# Patient Record
Sex: FEMALE | Race: WHITE | URBAN - METROPOLITAN AREA
[De-identification: names, ages, dates, MRNs, and addresses within clinical notes are randomized per-mention and may not be internally consistent; named-entity substitution may affect disease eponyms.]

---

## 2017-05-22 ENCOUNTER — OUTPATIENT (OUTPATIENT)
Dept: OUTPATIENT SERVICES | Facility: HOSPITAL | Age: 37
LOS: 1 days | Discharge: HOME | End: 2017-05-22

## 2017-06-28 DIAGNOSIS — J18.9 PNEUMONIA, UNSPECIFIED ORGANISM: ICD-10-CM

## 2017-12-27 ENCOUNTER — OUTPATIENT (OUTPATIENT)
Dept: OUTPATIENT SERVICES | Facility: HOSPITAL | Age: 37
LOS: 1 days | Discharge: HOME | End: 2017-12-27

## 2017-12-27 DIAGNOSIS — M54.5 LOW BACK PAIN: ICD-10-CM

## 2018-01-11 ENCOUNTER — EMERGENCY (EMERGENCY)
Facility: HOSPITAL | Age: 38
LOS: 0 days | Discharge: HOME | End: 2018-01-11

## 2018-01-11 DIAGNOSIS — M54.2 CERVICALGIA: ICD-10-CM
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
## 2018-01-11 DIAGNOSIS — S16.1XXA STRAIN OF MUSCLE, FASCIA AND TENDON AT NECK LEVEL, INITIAL ENCOUNTER: ICD-10-CM
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
## 2018-01-14 ENCOUNTER — OUTPATIENT (OUTPATIENT)
Dept: OUTPATIENT SERVICES | Facility: HOSPITAL | Age: 38
LOS: 1 days | Discharge: HOME | End: 2018-01-14

## 2018-01-14 DIAGNOSIS — M25.569 PAIN IN UNSPECIFIED KNEE: ICD-10-CM

## 2018-12-26 ENCOUNTER — OUTPATIENT (OUTPATIENT)
Dept: OUTPATIENT SERVICES | Facility: HOSPITAL | Age: 38
LOS: 1 days | Discharge: HOME | End: 2018-12-26

## 2018-12-26 DIAGNOSIS — R05 COUGH: ICD-10-CM

## 2019-05-26 ENCOUNTER — EMERGENCY (EMERGENCY)
Facility: HOSPITAL | Age: 39
LOS: 0 days | Discharge: HOME | End: 2019-05-26
Admitting: STUDENT IN AN ORGANIZED HEALTH CARE EDUCATION/TRAINING PROGRAM
Payer: COMMERCIAL

## 2019-05-26 VITALS
DIASTOLIC BLOOD PRESSURE: 68 MMHG | OXYGEN SATURATION: 100 % | HEART RATE: 82 BPM | TEMPERATURE: 98 F | SYSTOLIC BLOOD PRESSURE: 107 MMHG | RESPIRATION RATE: 18 BRPM

## 2019-05-26 DIAGNOSIS — Z88.1 ALLERGY STATUS TO OTHER ANTIBIOTIC AGENTS STATUS: ICD-10-CM

## 2019-05-26 DIAGNOSIS — Z88.0 ALLERGY STATUS TO PENICILLIN: ICD-10-CM

## 2019-05-26 DIAGNOSIS — K08.89 OTHER SPECIFIED DISORDERS OF TEETH AND SUPPORTING STRUCTURES: ICD-10-CM

## 2019-05-26 DIAGNOSIS — Z79.899 OTHER LONG TERM (CURRENT) DRUG THERAPY: ICD-10-CM

## 2019-05-26 PROCEDURE — 99283 EMERGENCY DEPT VISIT LOW MDM: CPT

## 2019-05-26 RX ORDER — KETOROLAC TROMETHAMINE 30 MG/ML
30 SYRINGE (ML) INJECTION ONCE
Refills: 0 | Status: DISCONTINUED | OUTPATIENT
Start: 2019-05-26 | End: 2019-05-26

## 2019-05-26 RX ADMIN — Medication 30 MILLIGRAM(S): at 08:46

## 2019-05-26 NOTE — ED PROVIDER NOTE - NS ED ROS FT
CONST: No fever, chills or bodyaches  ENT: see HPI  CARD: No chest pain, palpitations  RESP: No SOB, cough  MS: No joint pain, back pain or extremity pain/injury  SKIN: No rashes  NEURO: No headache, dizziness, paresthesias or LOC

## 2019-05-26 NOTE — ED PROVIDER NOTE - CLINICAL SUMMARY MEDICAL DECISION MAKING FREE TEXT BOX
38yo female with dental pain overlying temporary crown, No trismus, jaw swelling. PCN allergic. Will prescribe Clinda/Anaprox and pt has dental f/u.

## 2019-05-26 NOTE — ED PROVIDER NOTE - NSFOLLOWUPCLINICS_GEN_ALL_ED_FT
[facility]  Dental  [address] [place] [id number]  Phone: [phone]  Fax:   Follow Up Time:

## 2019-05-26 NOTE — ED PROVIDER NOTE - OBJECTIVE STATEMENT
38yo female with no significant PMHx presents c/o L lower dental pain worsening since last night, localized, non-radiating, constant. Patient notes she has a temporary crown and bridge in place and had a dental appt 4 days prior for permanent but had to cancel. She reports pain started then but has now worsened. She took motrin/tylenol throughout day yesterday and oxycodone from a leftover prescription at 0300. She denies jaw swelling, fever, chills, trismus.

## 2019-05-26 NOTE — ED PROVIDER NOTE - NSFOLLOWUPINSTRUCTIONS_ED_ALL_ED_FT
Dental Pain  ImageDental pain may be caused by many things, including:  Tooth decay (cavities or caries). Cavities expose the nerve of your tooth to air and to hot or cold temperatures. This can cause pain or discomfort.  Abscess or infection. A dental abscess is a collection of pus from a bacterial infection in the inner part of the tooth (pulp). It usually occurs at the end of the root of a tooth.  Injury.  An unknown reason (idiopathic).  Your pain may be mild or severe. It may occur when you are:  Chewing.  Exposed to hot or cold temperatures.  Eating or drinking sugary foods or beverages, such as soda or candy.  Your pain may be constant, or it may come and go without cause.    Follow these instructions at home:  Image   Watch your dental pain for any changes. The following actions may help to lessen any discomfort that you are feeling:    Medicines     Take over-the-counter and prescription medicines only as told by your health care provider.  If you were prescribed an antibiotic medicine, take it as told by your health care provider. Do not stop taking the antibiotic even if you start to feel better.  Eating and drinking     Avoid foods or drinks that cause you pain, such as:  Very hot or very cold foods or drinks.  Sweet or sugary foods or drinks.  Managing pain and swelling     Apply ice to the painful area of your face:  Put ice in a plastic bag.  Place a towel between your skin and the bag.  Leave the ice on for 20 minutes, 2–3 times a day.  Brushing your teeth     To keep your mouth and gums healthy, use fluoride toothpaste to brush your teeth twice a day. Floss once a day.  Use a toothpaste made for sensitive teeth if directed by your health care provider.  Brush your teeth with a soft-bristled toothbrush.  General instructions     Do not apply heat to the outside of your face.  Gargle with a salt-water mixture 3–4 times a day or as needed. To make a salt-water mixture, completely dissolve ½–1 tsp of salt in 1 cup of warm water.  Keep all follow-up visits as told by your health care provider. This is important.  Apply ice to the outside of your jaw if there is swelling. Do not put ice directly on the skin.  Contact a health care provider if:  Your pain is not controlled with medicines.  Your symptoms get worse.  You have new symptoms.  Get help right away if you:  Are unable to open your mouth.  Are having trouble breathing or swallowing.  Have a fever.  Notice that your face, neck, or jaw is swollen.  Summary  Dental pain may be caused by many things, including tooth decay and infection.  Your pain may be mild or severe.  Take over-the-counter and prescription medicines only as told by your health care provider.  Watch your dental pain for any changes. Let your health care provider know if symptoms get worse.  This information is not intended to replace advice given to you by your health care provider. Make sure you discuss any questions you have with your health care provider.

## 2019-05-26 NOTE — ED PROVIDER NOTE - PHYSICAL EXAMINATION
CONST: Well appearing in NAD  EYES: PERRL, EOMI, Sclera and conjunctiva clear.   ENT: ttp #17,18. No gingival swelling. No jaw swelling.  CARD: Normal S1 S2; Normal rate and rhythm  RESP: Equal BS B/L, No wheezes, rhonchi or rales. No distress.  SKIN: Warm, dry, no acute rashes. Good turgor

## 2019-05-26 NOTE — ED ADULT NURSE NOTE - OBJECTIVE STATEMENT
patient c/o worsening left lower dental pain since last night. states she has a double crown that's supposed to be replaced. slight left facial swelling noted. patient denies fevers

## 2019-07-20 PROBLEM — Z00.00 ENCOUNTER FOR PREVENTIVE HEALTH EXAMINATION: Status: ACTIVE | Noted: 2019-07-20

## 2019-08-14 ENCOUNTER — APPOINTMENT (OUTPATIENT)
Dept: BREAST CENTER | Facility: CLINIC | Age: 39
End: 2019-08-14
Payer: COMMERCIAL

## 2019-08-14 VITALS
WEIGHT: 127 LBS | SYSTOLIC BLOOD PRESSURE: 90 MMHG | BODY MASS INDEX: 25.6 KG/M2 | HEIGHT: 59 IN | DIASTOLIC BLOOD PRESSURE: 66 MMHG | TEMPERATURE: 98.5 F

## 2019-08-14 DIAGNOSIS — Z80.8 FAMILY HISTORY OF MALIGNANT NEOPLASM OF OTHER ORGANS OR SYSTEMS: ICD-10-CM

## 2019-08-14 DIAGNOSIS — I73.00 RAYNAUD'S SYNDROME W/OUT GANGRENE: ICD-10-CM

## 2019-08-14 DIAGNOSIS — Z86.39 PERSONAL HISTORY OF OTHER ENDOCRINE, NUTRITIONAL AND METABOLIC DISEASE: ICD-10-CM

## 2019-08-14 PROCEDURE — 99203 OFFICE O/P NEW LOW 30 MIN: CPT

## 2019-08-15 PROBLEM — Z80.8 FAMILY HISTORY OF MALIGNANT NEOPLASM OF BONE: Status: ACTIVE | Noted: 2019-08-15

## 2019-08-15 PROBLEM — Z86.39 HISTORY OF HYPOTHYROIDISM: Status: RESOLVED | Noted: 2019-08-15 | Resolved: 2019-08-15

## 2019-08-15 PROBLEM — Z86.39 HISTORY OF GRAVES' DISEASE: Status: RESOLVED | Noted: 2019-08-15 | Resolved: 2019-08-15

## 2019-08-15 PROBLEM — I73.00 RAYNAUD'S DISEASE WITHOUT GANGRENE: Status: RESOLVED | Noted: 2019-08-15 | Resolved: 2019-08-15

## 2019-08-15 RX ORDER — IRON/IRON ASP GLY/FA/MV-MIN 38 125-25-1MG
TABLET ORAL
Refills: 0 | Status: ACTIVE | COMMUNITY

## 2019-09-06 NOTE — HISTORY OF PRESENT ILLNESS
[FreeTextEntry1] : Caroline is a 39 premenopausal F with L breast pain. \par \par She has always had cyclical breast pain, but has noticed that it is getting worse on the left UOQ/left axillary area.  She has not palpated any suspicious breast masses, however her breasts always feel "lumpy" to her.  She denies any nipple discharge or retraction. \par \par Her most recent imaging was a b/l mammogram and US on 19 which revealed no suspicious findings in either breast, deemed BIRADS 1\par \par HISTORICAL RISK FACTORS:\par -no prior breast biopsies or surgeries \par -no family history of breast or ovarian cancer \par -, age at first live birth was 25 \par -prior OCP use in past, x 6 years, stopped at age 24\par \par \par HISTORICAL RISK FACTORS: \par -no prior rbeast biopsies or surgeries \par -no family hsitory of breast or ovarian cancer

## 2019-09-06 NOTE — REVIEW OF SYSTEMS
[Abn Vaginal Bleeding] : unexplained vaginal bleeding [As Noted in HPI] : as noted in HPI [Breast Pain] : breast pain [Negative] : Heme/Lymph [Skin Lesions] : no skin lesions [Skin Wound] : no skin wound [Breast Lump] : no breast lump [Hot Flashes] : no hot flashes [FreeTextEntry7] : fissure with pain

## 2019-09-06 NOTE — ASSESSMENT
[FreeTextEntry1] : Caroline is a 39 premenopausal F with left breast pain. \par \par On exam, there was no evidence of disease.  No suspicious lesions were palpated in either breast.  Her most recent imaging was a b/l mammogram and US on 4/18/19 which was unrevealing for any suspicious abnormalities, deemed BIRADS 1.  She will be due for her next b/l screening mammogram and US on 4/18/2020.  This will be scheduled for her today.  I would like to see her after her imaging for a CBE. \par \par In regards to her breast pain, it may be related to fibrocystic changes within her breast that are hormonally influenced. We spoke about possible interventions including evening primrose oil, supportive bras, and decreasing caffeine intake.  Although none of these have been consistently proven to improve breast pain, they may be tried.  If the pain becomes very severe, there have been studies of tamoxifen being effective for the treatment of breast pain, although there are risks with tamoxifen.  At this time she will try supportive measures.\par \par We discussed dense breasts.  Increasing breast density has been found to increase ones risk of breast cancer, but at this time, there is no clear indication for additional imaging in this setting, as both US and MRI have not been found to improve survival.  One can consider bilateral screening US.  However, out of 1000 women screened, the use of routine US will only identify an additional 3-5 cancers.  The use of US was found to increase the likelihood of undergoing more imaging and more biopsies.  She does have dense breasts.  We have decided to proceed with screening bilateral breast US at this time.  This will be scheduled with her next screening mammogram.\par \par She is otherwise at an average risk for breast cancer and should continue with annual screening mammograms and US. \par \par All of her questions were answered.  She knows to call with any further questions or concerns. \par \par PLAN: \par -f/up in 8 months after b/l mammogram and US due on 4/18/2020

## 2019-09-06 NOTE — PAST MEDICAL HISTORY
[Menstruating] : The patient is menstruating [Menarche Age ____] : age at menarche was [unfilled] [Irregular Cycle Intervals] : are  irregular [Total Preg ___] : G[unfilled] [Live Births ___] : P[unfilled]  [Age At Live Birth ___] : Age at live birth: [unfilled] [History of Hormone Replacement Treatment] : has no history of hormone replacement treatment [FreeTextEntry5] :  [FreeTextEntry6] : denies [FreeTextEntry7] : yes in past x 25 years, stopped at age 24\par  [FreeTextEntry8] : yes x 16-20 months

## 2019-09-06 NOTE — CONSULT LETTER
[Dear  ___] : Dear  [unfilled], [Consult Letter:] : I had the pleasure of evaluating your patient, [unfilled]. [Consult Closing:] : Thank you very much for allowing me to participate in the care of this patient.  If you have any questions, please do not hesitate to contact me. [Please see my note below.] : Please see my note below. [Sincerely,] : Sincerely, [FreeTextEntry2] : Nkechi Upton MD\par [address]\[place] [address]\par  [FreeTextEntry3] : Yana Ignacio MD \par Breast Surgical Oncologist\par [facility]\par [facility]\par [facility]\par  [DrSina  ___] : Dr. TORRES

## 2019-09-06 NOTE — DATA REVIEWED
[FreeTextEntry1] : OVERALL IMPRESSION: BI-RADS 1: NEGATIVE  There is no mammographic or sonographic evidence of malignancy. Further management of the palpable areas can be based on clinical grounds.  The findings and recommendations were discussed with the patient.  A 1 year screening mammogram and ultrasound of both breast(s) is recommended. A negative letter with history will be sent to the patient.  MAMMO TOMOSYNTHESIS DIAGNOSTIC BILATERAL, US BREAST LIMITED BILATERAL  Clinical Breast Exam: Patient does report clinical breast exam in the last year.  Clinical Indication: Patient has a palpable lump in the left breast. (accession [id number]), Clinical Indication: Patient has palpable lump in the left breast. (accession [id number])  Compared to: 10/18/2017 (accession [id number]), Compared to: 10/18/2017 US BREAST (accession [id number])  MAMMOGRAM:  Tomosynthesis and 2D imaging of the breast(s) were performed. Current study was also evaluated with a computer aided detection (CAD) system.  Breast Density: Extremely dense, which lowers the sensitivity of mammography.  No significant masses, calcifications, or other findings are seen in either breast.  US BREAST LIMITED BILATERAL  Color flow, Gray scale and real-time ultrasound of both breasts was performed and targeted to the area(s) of interest.   No suspicious abnormalities were seen sonographically in either breast.  Electronic Signature: I personally reviewed the images and agree with this report. Final Report: Dictated by and Signed by Attending Sammi Mathew MD 4/18/2019 3:42 PM

## 2019-09-06 NOTE — PHYSICAL EXAM
[Normocephalic] : normocephalic [Atraumatic] : atraumatic [EOMI] : extra ocular movement intact [No Cervical Adenopathy] : no cervical adenopathy [No Supraclavicular Adenopathy] : no supraclavicular adenopathy [Examined in the supine and seated position] : examined in the supine and seated position [Symmetrical] : symmetrical [No dominant masses] : no dominant masses in right breast  [No dominant masses] : no dominant masses left breast [No Nipple Retraction] : no left nipple retraction [No Nipple Discharge] : no right nipple discharge [Soft] : abdomen soft [No Axillary Lymphadenopathy] : no left axillary lymphadenopathy [Not Tender] : non-tender [No Rashes] : no rashes [No Edema] : no edema [No Ulceration] : no ulceration [de-identified] : multiple bilateral nodularities but no suspicious masses palpated in either breast; no nipple discharge or retraction and no other skin changes

## 2020-09-04 ENCOUNTER — OUTPATIENT (OUTPATIENT)
Dept: OUTPATIENT SERVICES | Facility: HOSPITAL | Age: 40
LOS: 1 days | Discharge: HOME | End: 2020-09-04
Payer: COMMERCIAL

## 2020-09-04 DIAGNOSIS — R10.10 UPPER ABDOMINAL PAIN, UNSPECIFIED: ICD-10-CM

## 2020-09-04 PROCEDURE — 76700 US EXAM ABDOM COMPLETE: CPT | Mod: 26

## 2020-09-17 ENCOUNTER — RESULT REVIEW (OUTPATIENT)
Age: 40
End: 2020-09-17

## 2020-09-17 ENCOUNTER — OUTPATIENT (OUTPATIENT)
Dept: OUTPATIENT SERVICES | Facility: HOSPITAL | Age: 40
LOS: 1 days | Discharge: HOME | End: 2020-09-17
Payer: COMMERCIAL

## 2020-09-17 DIAGNOSIS — R92.2 INCONCLUSIVE MAMMOGRAM: ICD-10-CM

## 2020-09-17 DIAGNOSIS — Z12.31 ENCOUNTER FOR SCREENING MAMMOGRAM FOR MALIGNANT NEOPLASM OF BREAST: ICD-10-CM

## 2020-09-17 PROCEDURE — 76641 ULTRASOUND BREAST COMPLETE: CPT | Mod: 26,50

## 2020-09-17 PROCEDURE — 77067 SCR MAMMO BI INCL CAD: CPT | Mod: 26

## 2020-09-17 PROCEDURE — 77063 BREAST TOMOSYNTHESIS BI: CPT | Mod: 26

## 2020-09-23 ENCOUNTER — APPOINTMENT (OUTPATIENT)
Dept: BREAST CENTER | Facility: CLINIC | Age: 40
End: 2020-09-23

## 2020-12-29 ENCOUNTER — INPATIENT (INPATIENT)
Facility: HOSPITAL | Age: 40
LOS: 1 days | Discharge: HOME | End: 2020-12-31
Attending: SURGERY | Admitting: SURGERY
Payer: COMMERCIAL

## 2020-12-29 VITALS
TEMPERATURE: 98 F | DIASTOLIC BLOOD PRESSURE: 76 MMHG | OXYGEN SATURATION: 99 % | HEART RATE: 91 BPM | RESPIRATION RATE: 18 BRPM | HEIGHT: 59 IN | WEIGHT: 132.94 LBS | SYSTOLIC BLOOD PRESSURE: 132 MMHG

## 2020-12-29 DIAGNOSIS — K29.70 GASTRITIS, UNSPECIFIED, WITHOUT BLEEDING: ICD-10-CM

## 2020-12-29 DIAGNOSIS — K35.80 UNSPECIFIED ACUTE APPENDICITIS: ICD-10-CM

## 2020-12-29 DIAGNOSIS — E05.00 THYROTOXICOSIS WITH DIFFUSE GOITER WITHOUT THYROTOXIC CRISIS OR STORM: ICD-10-CM

## 2020-12-29 DIAGNOSIS — I73.00 RAYNAUD'S SYNDROME WITHOUT GANGRENE: ICD-10-CM

## 2020-12-29 LAB
ALBUMIN SERPL ELPH-MCNC: 4.3 G/DL — SIGNIFICANT CHANGE UP (ref 3.5–5.2)
ALP SERPL-CCNC: 59 U/L — SIGNIFICANT CHANGE UP (ref 30–115)
ALT FLD-CCNC: 9 U/L — SIGNIFICANT CHANGE UP (ref 0–41)
ANION GAP SERPL CALC-SCNC: 10 MMOL/L — SIGNIFICANT CHANGE UP (ref 7–14)
APPEARANCE UR: CLEAR — SIGNIFICANT CHANGE UP
AST SERPL-CCNC: 12 U/L — SIGNIFICANT CHANGE UP (ref 0–41)
BASOPHILS # BLD AUTO: 0.04 K/UL — SIGNIFICANT CHANGE UP (ref 0–0.2)
BASOPHILS NFR BLD AUTO: 0.4 % — SIGNIFICANT CHANGE UP (ref 0–1)
BILIRUB SERPL-MCNC: 0.5 MG/DL — SIGNIFICANT CHANGE UP (ref 0.2–1.2)
BILIRUB UR-MCNC: NEGATIVE — SIGNIFICANT CHANGE UP
BUN SERPL-MCNC: 11 MG/DL — SIGNIFICANT CHANGE UP (ref 10–20)
CALCIUM SERPL-MCNC: 9 MG/DL — SIGNIFICANT CHANGE UP (ref 8.5–10.1)
CHLORIDE SERPL-SCNC: 104 MMOL/L — SIGNIFICANT CHANGE UP (ref 98–110)
CO2 SERPL-SCNC: 25 MMOL/L — SIGNIFICANT CHANGE UP (ref 17–32)
COLOR SPEC: YELLOW — SIGNIFICANT CHANGE UP
CREAT SERPL-MCNC: 0.7 MG/DL — SIGNIFICANT CHANGE UP (ref 0.7–1.5)
DIFF PNL FLD: ABNORMAL
EOSINOPHIL # BLD AUTO: 0.06 K/UL — SIGNIFICANT CHANGE UP (ref 0–0.7)
EOSINOPHIL NFR BLD AUTO: 0.5 % — SIGNIFICANT CHANGE UP (ref 0–8)
EPI CELLS # UR: ABNORMAL /HPF
GLUCOSE SERPL-MCNC: 87 MG/DL — SIGNIFICANT CHANGE UP (ref 70–99)
GLUCOSE UR QL: NEGATIVE MG/DL — SIGNIFICANT CHANGE UP
HCT VFR BLD CALC: 39.7 % — SIGNIFICANT CHANGE UP (ref 37–47)
HGB BLD-MCNC: 12.8 G/DL — SIGNIFICANT CHANGE UP (ref 12–16)
IMM GRANULOCYTES NFR BLD AUTO: 0.3 % — SIGNIFICANT CHANGE UP (ref 0.1–0.3)
KETONES UR-MCNC: NEGATIVE — SIGNIFICANT CHANGE UP
LEUKOCYTE ESTERASE UR-ACNC: NEGATIVE — SIGNIFICANT CHANGE UP
LIDOCAIN IGE QN: 18 U/L — SIGNIFICANT CHANGE UP (ref 7–60)
LYMPHOCYTES # BLD AUTO: 1.36 K/UL — SIGNIFICANT CHANGE UP (ref 1.2–3.4)
LYMPHOCYTES # BLD AUTO: 12.2 % — LOW (ref 20.5–51.1)
MAGNESIUM SERPL-MCNC: 2.1 MG/DL — SIGNIFICANT CHANGE UP (ref 1.8–2.4)
MCHC RBC-ENTMCNC: 29.6 PG — SIGNIFICANT CHANGE UP (ref 27–31)
MCHC RBC-ENTMCNC: 32.2 G/DL — SIGNIFICANT CHANGE UP (ref 32–37)
MCV RBC AUTO: 91.7 FL — SIGNIFICANT CHANGE UP (ref 81–99)
MONOCYTES # BLD AUTO: 0.69 K/UL — HIGH (ref 0.1–0.6)
MONOCYTES NFR BLD AUTO: 6.2 % — SIGNIFICANT CHANGE UP (ref 1.7–9.3)
NEUTROPHILS # BLD AUTO: 8.94 K/UL — HIGH (ref 1.4–6.5)
NEUTROPHILS NFR BLD AUTO: 80.4 % — HIGH (ref 42.2–75.2)
NITRITE UR-MCNC: NEGATIVE — SIGNIFICANT CHANGE UP
NRBC # BLD: 0 /100 WBCS — SIGNIFICANT CHANGE UP (ref 0–0)
PH UR: 7.5 — SIGNIFICANT CHANGE UP (ref 5–8)
PLATELET # BLD AUTO: 199 K/UL — SIGNIFICANT CHANGE UP (ref 130–400)
POTASSIUM SERPL-MCNC: 3.8 MMOL/L — SIGNIFICANT CHANGE UP (ref 3.5–5)
POTASSIUM SERPL-SCNC: 3.8 MMOL/L — SIGNIFICANT CHANGE UP (ref 3.5–5)
PROT SERPL-MCNC: 6.8 G/DL — SIGNIFICANT CHANGE UP (ref 6–8)
PROT UR-MCNC: NEGATIVE MG/DL — SIGNIFICANT CHANGE UP
RAPID RVP RESULT: SIGNIFICANT CHANGE UP
RBC # BLD: 4.33 M/UL — SIGNIFICANT CHANGE UP (ref 4.2–5.4)
RBC # FLD: 12.4 % — SIGNIFICANT CHANGE UP (ref 11.5–14.5)
RBC CASTS # UR COMP ASSIST: SIGNIFICANT CHANGE UP /HPF
SARS-COV-2 RNA SPEC QL NAA+PROBE: SIGNIFICANT CHANGE UP
SODIUM SERPL-SCNC: 139 MMOL/L — SIGNIFICANT CHANGE UP (ref 135–146)
SP GR SPEC: 1.02 — SIGNIFICANT CHANGE UP (ref 1.01–1.03)
UROBILINOGEN FLD QL: 0.2 MG/DL — SIGNIFICANT CHANGE UP (ref 0.2–0.2)
WBC # BLD: 11.12 K/UL — HIGH (ref 4.8–10.8)
WBC # FLD AUTO: 11.12 K/UL — HIGH (ref 4.8–10.8)
WBC UR QL: SIGNIFICANT CHANGE UP /HPF

## 2020-12-29 PROCEDURE — 99285 EMERGENCY DEPT VISIT HI MDM: CPT

## 2020-12-29 PROCEDURE — 71046 X-RAY EXAM CHEST 2 VIEWS: CPT | Mod: 26

## 2020-12-29 PROCEDURE — 74177 CT ABD & PELVIS W/CONTRAST: CPT | Mod: 26

## 2020-12-29 RX ORDER — METRONIDAZOLE 500 MG
TABLET ORAL
Refills: 0 | Status: COMPLETED | OUTPATIENT
Start: 2020-12-29 | End: 2020-12-30

## 2020-12-29 RX ORDER — SODIUM CHLORIDE 9 MG/ML
1000 INJECTION INTRAMUSCULAR; INTRAVENOUS; SUBCUTANEOUS ONCE
Refills: 0 | Status: COMPLETED | OUTPATIENT
Start: 2020-12-29 | End: 2020-12-29

## 2020-12-29 RX ORDER — LEVOTHYROXINE SODIUM 125 MCG
112 TABLET ORAL DAILY
Refills: 0 | Status: DISCONTINUED | OUTPATIENT
Start: 2020-12-29 | End: 2020-12-31

## 2020-12-29 RX ORDER — CHLORHEXIDINE GLUCONATE 213 G/1000ML
1 SOLUTION TOPICAL
Refills: 0 | Status: DISCONTINUED | OUTPATIENT
Start: 2020-12-29 | End: 2020-12-31

## 2020-12-29 RX ORDER — PANTOPRAZOLE SODIUM 20 MG/1
40 TABLET, DELAYED RELEASE ORAL
Refills: 0 | Status: DISCONTINUED | OUTPATIENT
Start: 2020-12-29 | End: 2020-12-29

## 2020-12-29 RX ORDER — PANTOPRAZOLE SODIUM 20 MG/1
40 TABLET, DELAYED RELEASE ORAL DAILY
Refills: 0 | Status: DISCONTINUED | OUTPATIENT
Start: 2020-12-29 | End: 2020-12-30

## 2020-12-29 RX ORDER — PENTOXIFYLLINE 400 MG
400 TABLET, EXTENDED RELEASE ORAL THREE TIMES A DAY
Refills: 0 | Status: DISCONTINUED | OUTPATIENT
Start: 2020-12-29 | End: 2020-12-29

## 2020-12-29 RX ORDER — DIPHENHYDRAMINE HYDROCHLORIDE AND LIDOCAINE HYDROCHLORIDE AND ALUMINUM HYDROXIDE AND MAGNESIUM HYDRO
30 KIT ONCE
Refills: 0 | Status: COMPLETED | OUTPATIENT
Start: 2020-12-29 | End: 2020-12-29

## 2020-12-29 RX ORDER — PENTOXIFYLLINE 400 MG
400 TABLET, EXTENDED RELEASE ORAL THREE TIMES A DAY
Refills: 0 | Status: DISCONTINUED | OUTPATIENT
Start: 2020-12-29 | End: 2020-12-31

## 2020-12-29 RX ORDER — PANTOPRAZOLE SODIUM 20 MG/1
40 TABLET, DELAYED RELEASE ORAL ONCE
Refills: 0 | Status: DISCONTINUED | OUTPATIENT
Start: 2020-12-29 | End: 2020-12-30

## 2020-12-29 RX ORDER — DEXTROSE MONOHYDRATE, SODIUM CHLORIDE, AND POTASSIUM CHLORIDE 50; .745; 4.5 G/1000ML; G/1000ML; G/1000ML
1000 INJECTION, SOLUTION INTRAVENOUS
Refills: 0 | Status: DISCONTINUED | OUTPATIENT
Start: 2020-12-29 | End: 2020-12-30

## 2020-12-29 RX ORDER — CEFOTETAN DISODIUM 1 G
1 VIAL (EA) INJECTION ONCE
Refills: 0 | Status: COMPLETED | OUTPATIENT
Start: 2020-12-29 | End: 2020-12-29

## 2020-12-29 RX ORDER — CEFOTETAN DISODIUM 1 G
1 VIAL (EA) INJECTION EVERY 12 HOURS
Refills: 0 | Status: DISCONTINUED | OUTPATIENT
Start: 2020-12-29 | End: 2020-12-31

## 2020-12-29 RX ORDER — METRONIDAZOLE 500 MG
500 TABLET ORAL ONCE
Refills: 0 | Status: COMPLETED | OUTPATIENT
Start: 2020-12-29 | End: 2020-12-29

## 2020-12-29 RX ADMIN — Medication 100 MILLIGRAM(S): at 21:37

## 2020-12-29 RX ADMIN — DIPHENHYDRAMINE HYDROCHLORIDE AND LIDOCAINE HYDROCHLORIDE AND ALUMINUM HYDROXIDE AND MAGNESIUM HYDRO 30 MILLILITER(S): KIT at 17:52

## 2020-12-29 RX ADMIN — SODIUM CHLORIDE 1000 MILLILITER(S): 9 INJECTION INTRAMUSCULAR; INTRAVENOUS; SUBCUTANEOUS at 17:52

## 2020-12-29 RX ADMIN — MORPHINE SULFATE 2 MILLIGRAM(S): 50 CAPSULE, EXTENDED RELEASE ORAL at 02:04

## 2020-12-29 RX ADMIN — Medication 100 GRAM(S): at 20:23

## 2020-12-29 NOTE — ED PROVIDER NOTE - ATTENDING CONTRIBUTION TO CARE
40y female h/o gastritis with recent clean EGD with epigastric pain worse on movement, palpation, and deep inspiration, was followed by dietary indiscretion but does not feel like her reflux, on exam vital signs appreciated, nontoxic appearing cor reg lungs cta abd +bs, soft with epi, ruq, and right mid abdominal ttp no g/r, will check labs, imaging

## 2020-12-29 NOTE — H&P ADULT - NSHPREVIEWOFSYSTEMS_GEN_ALL_CORE
REVIEW OF SYSTEMS:    CONSTITUTIONAL: No weakness, fevers or chills  EYES/ENT: No visual changes;  No vertigo or throat pain   NECK: No pain or stiffness  RESPIRATORY: No cough, wheezing, hemoptysis; No shortness of breath  CARDIOVASCULAR: No chest pain or palpitations  GASTROINTESTINAL: +abdominal and mostly epigastric pain.  GENITOURINARY: No dysuria, frequency or hematuria  NEUROLOGICAL: No numbness or weakness  SKIN: No itching, rashes

## 2020-12-29 NOTE — H&P ADULT - ASSESSMENT
· H 41 y/o female with hx of GERD  presents to the ED with epigastric pain worsening over past 5 days. patient states she stopped taking her omeprazole a month ago. patient states prior to onset of pain, she had been drinking alcohol. patient denies any black or bloody stool or hematemesis . patient denies any dysuria or back pain. patient denies any sob, cp. patient describes pain as burning. no relief with restarting her medications or pepcid otc.

## 2020-12-29 NOTE — H&P ADULT - HISTORY OF PRESENT ILLNESS
·   · H 39 y/o female with hx of GERD  presents to the ED with epigastric pain worsening over past 5 days. patient states she stopped taking her omeprazole a month ago. patient states prior to onset of pain, she had been drinking alcohol. patient denies any black or bloody stool or hematemesis . patient denies any dysuria or back pain. patient denies any sob, cp. patient describes pain as burning. no relief with restarting her medications or pepcid otc.  
Ambulatory

## 2020-12-29 NOTE — ED PROVIDER NOTE - CLINICAL SUMMARY MEDICAL DECISION MAKING FREE TEXT BOX
Pt  with  epigastric  pain ttp - labs reviewed wnl CT with  early appendicitis -  abx given   dw surgery Pt  with  epigastric  pain ttp - labs reviewed wnl CT with  early appendicitis -  abx given   dw surgery   admitted to [facility]

## 2020-12-29 NOTE — H&P ADULT - NSHPPHYSICALEXAM_GEN_ALL_CORE
GENERAL:  39y/o Female NAD, resting comfortably.  HEAD:  Atraumatic, Normocephalic  EYES: EOMI, PERRLA, conjunctiva and sclera clear  NECK: Supple, No JVD, no cervical lymphadenopathy, non-tender  CHEST/LUNG: Clear to auscultation bilaterally; No wheeze, rhonchi, or rales  HEART: Regular rate and rhythm; S1&S2  ABDOMEN: Soft, Nontender, Nondistended in lower abdomen.  epigastric pain on palpation Bowel sounds present  EXTREMITIES:   Peripheral Pulses Present, No clubbing, no cyanosis, or no edema, no calf tenderness  PSYCH: AAOx3, cooperative, appropriate  NEUROLOGY: WNL  SKIN: WNL

## 2020-12-29 NOTE — H&P ADULT - NSHPRISKHIVSCREEN_GEN_ALL_CORE
--------------- APPROVED REPORT --------------





Date of service: 08/12/2018



EKG Measurement

Heart [id number]

MA [id number]

[id number]

[id number]

[id number]



<Conclusion>

Normal sinus rhythm

Abnormal QRS-T angle, consider primary T wave abnormality

Abnormal ECG
Offered and patient declined

## 2020-12-29 NOTE — H&P ADULT - NSHPLABSRESULTS_GEN_ALL_CORE
12.8   11. )-----------( 199      ( 29 Dec 2020 17:30 )             39.7           139  |  104  |  11  ----------------------------<  87  3.8   |  25  |  0.7    Ca    9.0      29 Dec 2020 17:30  Mg     2.1         TPro  6.8  /  Alb  4.3  /  TBili  0.5  /  DBili  x   /  AST  12  /  ALT  9   /  AlkPhos  59                Urinalysis Basic - ( 29 Dec 2020 17:30 )    Color: Yellow / Appearance: Clear / S.025 / pH: x  Gluc: x / Ketone: Negative  / Bili: Negative / Urobili: 0.2 mg/dL   Blood: x / Protein: Negative mg/dL / Nitrite: Negative   Leuk Esterase: Negative / RBC: 1-2 /HPF / WBC 1-2 /HPF   Sq Epi: x / Non Sq Epi: Few /HPF / Bacteria: x            Lactate Trend            CAPILLARY BLOOD GLUCOSE

## 2020-12-29 NOTE — ED ADULT TRIAGE NOTE - CHIEF COMPLAINT QUOTE
pt c/o epigastric abd pain 4 days, hx of gastritis, pt reports that she called her GI MD and he told her to come to the ER, pt denies n/v.

## 2020-12-29 NOTE — ED PROVIDER NOTE - OBJECTIVE STATEMENT
41 y/o female with hx of GERD  presents to the ED with epigastric pain worsening over past few days. patient states she stopped taking her omeprazole a month ago. patient states prior to onset of pain, she had been drinking alcohol. patient denies any black or bloody stool or hematemesis . patient denies any dysuria or back pain. patient denies any sob, cp. patient describes pain as burning. no relief with restarting her medications or pepcid otc.

## 2020-12-29 NOTE — ED PROVIDER NOTE - PHYSICAL EXAMINATION
Vital Signs: I have reviewed the initial vital signs.  Constitutional: well-nourished, no acute distress, normocephalic  Eyes: PERRLA, EOMI,  clear conjunctiva  ENT: MMM  Cardiovascular: regular rate, regular rhythm, no murmur appreciated  Respiratory: unlabored respiratory effort, clear to auscultation bilaterally  Gastrointestinal: soft, non-tender, non-distended  abdomen, no pulsatile mass, no CVA tenderness  Musculoskeletal: supple neck, no lower extremity edema, no bony tenderness  Integumentary: warm, dry, no rash  Neurologic: awake, alert, cranial nerves II-XII grossly intact, extremities’ motor and sensory functions grossly intact, no focal deficits, Vital Signs: I have reviewed the initial vital signs.  Constitutional: well-nourished, no acute distress, normocephalic  Eyes: PERRLA, EOMI,  clear conjunctiva  ENT: MMM  Cardiovascular: regular rate, regular rhythm, no murmur appreciated  Respiratory: unlabored respiratory effort, clear to auscultation bilaterally  Gastrointestinal: soft, epigastric tenderness, non-distended  abdomen, no pulsatile mass, no CVA tenderness  Musculoskeletal: supple neck, no lower extremity edema, no bony tenderness  Integumentary: warm, dry, no rash  Neurologic: awake, alert, cranial nerves II-XII grossly intact, extremities’ motor and sensory functions grossly intact, no focal deficits,

## 2020-12-29 NOTE — ED PROVIDER NOTE - NS ED ROS FT
Review of Systems    Constitutional: (-) fever/ chills (-)loss of appetite or  weight loss  Cardiovascular: (-) chest pain, (-) syncope (-) palpitations  Respiratory: (-) cough, (-) shortness of breath  Gastrointestinal: (-) vomiting, (-) diarrhea (-) abdominal pain  : (-) dysuria , hematuria   Musculoskeletal:  (-) back pain, (-) joint pain   Integumentary: (-) rash, (-) swelling  Neurological: (-) headache, (-) altered mental status

## 2020-12-29 NOTE — PHARMACOTHERAPY INTERVENTION NOTE - COMMENTS
Spoke with MILAN Nuñez [phone], recommended to put limit of "stop after 1 dose" for potassium 1 liter bag, as current order has instructions to do so, however is actually a continuous drip. Reminded PA that order will continue to be in patient's eMAR until provider physically goes in and discontinues the order. Provider aware and wishes to keep order as is, and will monitor the patient.

## 2020-12-30 LAB
ALBUMIN SERPL ELPH-MCNC: 3.6 G/DL — SIGNIFICANT CHANGE UP (ref 3.5–5.2)
ALP SERPL-CCNC: 55 U/L — SIGNIFICANT CHANGE UP (ref 30–115)
ALT FLD-CCNC: 8 U/L — SIGNIFICANT CHANGE UP (ref 0–41)
AMYLASE P1 CFR SERPL: 49 U/L — SIGNIFICANT CHANGE UP (ref 25–115)
ANION GAP SERPL CALC-SCNC: 9 MMOL/L — SIGNIFICANT CHANGE UP (ref 7–14)
AST SERPL-CCNC: 11 U/L — SIGNIFICANT CHANGE UP (ref 0–41)
BILIRUB SERPL-MCNC: 0.6 MG/DL — SIGNIFICANT CHANGE UP (ref 0.2–1.2)
BUN SERPL-MCNC: 8 MG/DL — LOW (ref 10–20)
CALCIUM SERPL-MCNC: 8.3 MG/DL — LOW (ref 8.5–10.1)
CHLORIDE SERPL-SCNC: 106 MMOL/L — SIGNIFICANT CHANGE UP (ref 98–110)
CO2 SERPL-SCNC: 24 MMOL/L — SIGNIFICANT CHANGE UP (ref 17–32)
CREAT SERPL-MCNC: 0.6 MG/DL — LOW (ref 0.7–1.5)
CULTURE RESULTS: SIGNIFICANT CHANGE UP
GLUCOSE SERPL-MCNC: 74 MG/DL — SIGNIFICANT CHANGE UP (ref 70–99)
HCT VFR BLD CALC: 35 % — LOW (ref 37–47)
HGB BLD-MCNC: 11.5 G/DL — LOW (ref 12–16)
MCHC RBC-ENTMCNC: 30 PG — SIGNIFICANT CHANGE UP (ref 27–31)
MCHC RBC-ENTMCNC: 32.9 G/DL — SIGNIFICANT CHANGE UP (ref 32–37)
MCV RBC AUTO: 91.4 FL — SIGNIFICANT CHANGE UP (ref 81–99)
NRBC # BLD: 0 /100 WBCS — SIGNIFICANT CHANGE UP (ref 0–0)
PLATELET # BLD AUTO: 160 K/UL — SIGNIFICANT CHANGE UP (ref 130–400)
POTASSIUM SERPL-MCNC: 3.8 MMOL/L — SIGNIFICANT CHANGE UP (ref 3.5–5)
POTASSIUM SERPL-SCNC: 3.8 MMOL/L — SIGNIFICANT CHANGE UP (ref 3.5–5)
PROT SERPL-MCNC: 5.8 G/DL — LOW (ref 6–8)
RBC # BLD: 3.83 M/UL — LOW (ref 4.2–5.4)
RBC # FLD: 12.4 % — SIGNIFICANT CHANGE UP (ref 11.5–14.5)
SODIUM SERPL-SCNC: 139 MMOL/L — SIGNIFICANT CHANGE UP (ref 135–146)
SPECIMEN SOURCE: SIGNIFICANT CHANGE UP
WBC # BLD: 6.91 K/UL — SIGNIFICANT CHANGE UP (ref 4.8–10.8)
WBC # FLD AUTO: 6.91 K/UL — SIGNIFICANT CHANGE UP (ref 4.8–10.8)

## 2020-12-30 PROCEDURE — 74177 CT ABD & PELVIS W/CONTRAST: CPT | Mod: 26

## 2020-12-30 PROCEDURE — 99222 1ST HOSP IP/OBS MODERATE 55: CPT

## 2020-12-30 PROCEDURE — 76705 ECHO EXAM OF ABDOMEN: CPT | Mod: 26

## 2020-12-30 RX ORDER — SODIUM CHLORIDE 9 MG/ML
1000 INJECTION INTRAMUSCULAR; INTRAVENOUS; SUBCUTANEOUS
Refills: 0 | Status: DISCONTINUED | OUTPATIENT
Start: 2020-12-30 | End: 2020-12-31

## 2020-12-30 RX ORDER — ONDANSETRON 8 MG/1
4 TABLET, FILM COATED ORAL ONCE
Refills: 0 | Status: COMPLETED | OUTPATIENT
Start: 2020-12-30 | End: 2020-12-30

## 2020-12-30 RX ORDER — SODIUM CHLORIDE 9 MG/ML
500 INJECTION INTRAMUSCULAR; INTRAVENOUS; SUBCUTANEOUS ONCE
Refills: 0 | Status: COMPLETED | OUTPATIENT
Start: 2020-12-30 | End: 2020-12-30

## 2020-12-30 RX ORDER — ACETAMINOPHEN 500 MG
650 TABLET ORAL EVERY 6 HOURS
Refills: 0 | Status: DISCONTINUED | OUTPATIENT
Start: 2020-12-30 | End: 2020-12-31

## 2020-12-30 RX ORDER — SODIUM CHLORIDE 9 MG/ML
1000 INJECTION, SOLUTION INTRAVENOUS
Refills: 0 | Status: DISCONTINUED | OUTPATIENT
Start: 2020-12-30 | End: 2020-12-30

## 2020-12-30 RX ORDER — PANTOPRAZOLE SODIUM 20 MG/1
40 TABLET, DELAYED RELEASE ORAL
Refills: 0 | Status: DISCONTINUED | OUTPATIENT
Start: 2020-12-30 | End: 2020-12-31

## 2020-12-30 RX ORDER — IOHEXOL 300 MG/ML
30 INJECTION, SOLUTION INTRAVENOUS ONCE
Refills: 0 | Status: COMPLETED | OUTPATIENT
Start: 2020-12-30 | End: 2020-12-30

## 2020-12-30 RX ORDER — OXYCODONE AND ACETAMINOPHEN 5; 325 MG/1; MG/1
1 TABLET ORAL ONCE
Refills: 0 | Status: DISCONTINUED | OUTPATIENT
Start: 2020-12-30 | End: 2020-12-30

## 2020-12-30 RX ORDER — MORPHINE SULFATE 50 MG/1
2 CAPSULE, EXTENDED RELEASE ORAL EVERY 4 HOURS
Refills: 0 | Status: DISCONTINUED | OUTPATIENT
Start: 2020-12-30 | End: 2020-12-31

## 2020-12-30 RX ADMIN — Medication 650 MILLIGRAM(S): at 10:19

## 2020-12-30 RX ADMIN — PANTOPRAZOLE SODIUM 40 MILLIGRAM(S): 20 TABLET, DELAYED RELEASE ORAL at 22:03

## 2020-12-30 RX ADMIN — Medication 100 GRAM(S): at 05:05

## 2020-12-30 RX ADMIN — ONDANSETRON 4 MILLIGRAM(S): 8 TABLET, FILM COATED ORAL at 11:19

## 2020-12-30 RX ADMIN — Medication 400 MILLIGRAM(S): at 17:13

## 2020-12-30 RX ADMIN — SODIUM CHLORIDE 100 MILLILITER(S): 9 INJECTION, SOLUTION INTRAVENOUS at 12:35

## 2020-12-30 RX ADMIN — Medication 400 MILLIGRAM(S): at 22:03

## 2020-12-30 RX ADMIN — Medication 20 MILLIGRAM(S): at 22:03

## 2020-12-30 RX ADMIN — Medication 650 MILLIGRAM(S): at 18:06

## 2020-12-30 RX ADMIN — IOHEXOL 30 MILLILITER(S): 300 INJECTION, SOLUTION INTRAVENOUS at 14:25

## 2020-12-30 RX ADMIN — MORPHINE SULFATE 2 MILLIGRAM(S): 50 CAPSULE, EXTENDED RELEASE ORAL at 01:34

## 2020-12-30 RX ADMIN — Medication 112 MICROGRAM(S): at 07:14

## 2020-12-30 RX ADMIN — MORPHINE SULFATE 2 MILLIGRAM(S): 50 CAPSULE, EXTENDED RELEASE ORAL at 08:45

## 2020-12-30 RX ADMIN — Medication 650 MILLIGRAM(S): at 19:28

## 2020-12-30 RX ADMIN — SODIUM CHLORIDE 500 MILLILITER(S): 9 INJECTION INTRAMUSCULAR; INTRAVENOUS; SUBCUTANEOUS at 21:34

## 2020-12-30 RX ADMIN — OXYCODONE AND ACETAMINOPHEN 1 TABLET(S): 5; 325 TABLET ORAL at 21:34

## 2020-12-30 RX ADMIN — PANTOPRAZOLE SODIUM 40 MILLIGRAM(S): 20 TABLET, DELAYED RELEASE ORAL at 11:18

## 2020-12-30 RX ADMIN — Medication 100 GRAM(S): at 17:11

## 2020-12-30 RX ADMIN — Medication 650 MILLIGRAM(S): at 11:20

## 2020-12-30 RX ADMIN — SODIUM CHLORIDE 100 MILLILITER(S): 9 INJECTION INTRAMUSCULAR; INTRAVENOUS; SUBCUTANEOUS at 21:34

## 2020-12-30 RX ADMIN — Medication 400 MILLIGRAM(S): at 08:34

## 2020-12-30 RX ADMIN — MORPHINE SULFATE 2 MILLIGRAM(S): 50 CAPSULE, EXTENDED RELEASE ORAL at 08:15

## 2020-12-30 RX ADMIN — SODIUM CHLORIDE 500 MILLILITER(S): 9 INJECTION INTRAMUSCULAR; INTRAVENOUS; SUBCUTANEOUS at 11:17

## 2020-12-30 NOTE — PROGRESS NOTE ADULT - SUBJECTIVE AND OBJECTIVE BOX
CT Abdomen and Pelvis w/ Oral Cont and w/ IV Cont (12.30.20 @ 16:37) >  IMPRESSION:    On the current scan there appears to be thickening of the wall of the prepyloric region of the gastric antrum compatible with gastritis. No pneumoperitoneum.    The appendix is normal in appearance, with a small amount of contrast refluxing into the appendix.      US Abdomen Limited (12.30.20 @ 13:10) >  IMPRESSION:    Normal right upper quadrant abdominal ultrasound.    ===============================================================================================    Above D/W Dr. Prince: will start full liquid diet; cont abx for now; continue protonix BID; f/u GI

## 2020-12-30 NOTE — CONSULT NOTE ADULT - ASSESSMENT
40yFemale pmh GERD, hypothyroidism presents with epigastric pain worsening over the past five days worse with food but still persistent without food. Patient states she had intermittent ETOH use socially. Patient reports last EGD 9/2020 reportedly normal and last colonoscopy last year reportedly normal as well by patient by. Dr Lange in [place].    Problem 1-Epigastric pain  ddx gastritis, PUD, esophagitis  Rec  -Attempted to obtain records from Dr. Lange of most recent EGD/Colonoscopy but office is closed  -Please obtain records of last EGD/Colonoscopy   -PPI BID  -Advised ETOH cessation  -Obtain RUQ ultrasound  -Given recent EGD would be prudent to review records once obtained and await improvement on conservative measures like PPI BID and GERD diet however if patient does not improve we can consider EGD  -Follow-up with patient's private GI Dr. Lange    Problem 2-Mild appendiceal enlargement with pericecal inflammatory change . Findings suggest early appendicitis Note the right ovary atypically positioned just inferior to the tip of inflamed appendix along the lateral conal fascia  There is surrounding inflammatory change along the cecal base with small reactive size lymph nodes   Rec  - Care as per primary team   -Outpatient Colonoscopy with Dr. Lange patient's private GI    Problem 3-mild localized right mid hydroureter, may be secondary to regional inflammation/ileus pattern  Rec  - Care as per primary team      40yFemale pmh GERD, hypothyroidism presents with epigastric pain worsening over the past five days worse with food but still persistent without food. Patient states she had intermittent ETOH use socially. Patient reports last EGD 9/2020 reportedly normal and last colonoscopy last year reportedly normal as well by patient by. Dr Lange in [place].    Problem 1-Epigastric pain  ddx gastritis, PUD, esophagitis  Rec  -Attempting to obtain records from Dr. Lange of most recent EGD/Colonoscopy, they will fax over records   -Upgrade PPI to BID  -Advised ETOH cessation  -Obtain RUQ ultrasound appreciated and normal gallbladder WNL and CBD 4mm  -Given recent EGD would be prudent to review records once obtained and await improvement on conservative measures like PPI BID and GERD diet however if patient does not improve we can consider EGD  -Follow-up with patient's private GI Dr. Lange    Problem 2-Mild appendiceal enlargement with pericecal inflammatory change . Findings suggest early appendicitis Note the right ovary atypically positioned just inferior to the tip of inflamed appendix along the lateral conal fascia  There is surrounding inflammatory change along the cecal base with small reactive size lymph nodes   Rec  - Care as per primary team   -Outpatient Colonoscopy with Dr. Lange patient's private GI    Problem 3-mild localized right mid hydroureter, may be secondary to regional inflammation/ileus pattern  Rec  - Care as per primary team

## 2020-12-30 NOTE — CHART NOTE - NSCHARTNOTEFT_GEN_A_CORE
Dr. Lange, GI  EGD Results   5/2020   Impressions   -chronic gastritis of antrum without bleeding   -Path without HP and without dysplasia or malignancy or evidence of celiac disease   -GE junction path negative for IM, dysplasia or malignancy     Colonoscopy 9/2018 with excellent prep  Impressions   -small Hemorrhoids other than that, unremarkable   -Ileal biopsies taken without diagnostic abnormalities on path  -Rectal biopsies negative for cryptitis, crypt abscess, or granulomas, no evidence of microscopic colitis, dysplasia or malignancy

## 2020-12-30 NOTE — PROGRESS NOTE ADULT - ASSESSMENT
40 yr old female with HX Gastritis (last EGD Sep 2020: nml), chronic RLQ pain due to GYN issues - now with continue d epigastric pain, an dmild RLQ pain    1. R/O early  appendicitis   - exam not totally consistent with appendicitis though there'e periappendiceal inflammation on CT: continue IV abx and monitor abd exams  - continue NPO x meds, IV fluids    2. Gastritis   - contuinue protonix   - consider GI consult  her GI MD is in [place])    Will D/W attending

## 2020-12-30 NOTE — CONSULT NOTE ADULT - SUBJECTIVE AND OBJECTIVE BOX
Chief complaint/Reason for consult: epigastric pain    HPI:  ·   · H 39 y/o female with hx of GERD  presents to the ED with epigastric pain worsening over past 5 days. patient states she stopped taking her omeprazole a month ago. patient states prior to onset of pain, she had been drinking alcohol. patient denies any black or bloody stool or hematemesis . patient denies any dysuria or back pain. patient denies any sob, cp. patient describes pain as burning. no relief with restarting her medications or pepcid otc.   (29 Dec 2020 21:16)    GI Updates: 40y[facility] GERD, hypothyroidism presents with epigastric pain worsening over the past five days worse with food but still persistent without food. Patient states she had intermittent ETOH use socially. Currently Patient denies nausea, vomiting, hematemesis, melena, blood in stool, diarrhea, constipation. +epigastric pain 5-6/10      PAST MEDICAL & SURGICAL HISTORY:   Hypothyroid    Raynaud disease    Graves disease    Gastritis          Family history:  FAMILY HISTORY:    No GI cancers in first or second degree relatives    Social History: No smoking. +occasional alcohol use. No illegal drug use.    Allergies:   Cipro (Unknown)  penicillin (Unknown)      MEDICATIONS: Home Medications:  Bentyl 20 mg oral tablet: 1 tab(s) orally 4 times a day (29 Dec 2020 16:35)  meloxicam 15 mg oral tablet: 1 tab(s) orally once a day (29 Dec 2020 16:35)  omeprazole 40 mg oral delayed release capsule: 1 cap(s) orally once a day (29 Dec 2020 16:35)  pentoxifylline 400 mg oral tablet, extended release: 1 tab(s) orally 3 times a day (29 Dec 2020 16:35)  Synthroid 112 mcg (0.112 mg) oral tablet: 1 tab(s) orally once a day (29 Dec 2020 16:35)    MEDICATIONS  (STANDING):  cefoTEtan  IVPB 1 Gram(s) IV Intermittent every 12 hours  chlorhexidine 4% Liquid 1 Application(s) Topical <User Schedule>  dextrose 5% + sodium chloride 0.9%. 1000 milliLiter(s) (100 mL/Hr) IV Continuous <Continuous>  dicyclomine 20 milliGRAM(s) Oral four times a day before meals  levothyroxine 112 MICROGram(s) Oral daily  pantoprazole  Injectable 40 milliGRAM(s) IV Push once  pantoprazole  Injectable 40 milliGRAM(s) IV Push daily  pentoxifylline 400 milliGRAM(s) Oral three times a day    MEDICATIONS  (PRN):  acetaminophen   Tablet .. 650 milliGRAM(s) Oral every 6 hours PRN Mild Pain (1 - 3)  morphine  - Injectable 2 milliGRAM(s) IV Push every 4 hours PRN Moderate Pain (4 - 6)        REVIEW OF SYSTEMS  General:  No weight loss, fevers, or chills.  Eyes:  No reported pain or visual changes  ENT:  No sore throat or runny nose.  NECK: No stiffness or lymphadenopathy  CV:  No chest pain or palpitations.  Resp:  No shortness of breath, cough, wheezing or hemoptysis  GI:  No abdominal pain, nausea, vomiting, dysphagia, diarrhea or constipation. No rectal bleeding, melena, or hematemesis.  Muscle:  No aches or weakness  Neuro:  No tingling, numbness       VITALS:   T(F): 97.3 (12-30-20 @ 10:58), Max: 97.8 (12-29-20 @ 16:31)  HR: 80 (12-30-20 @ 10:58) (69 - 91)  BP: 88/51 (12-30-20 @ 10:58) (88/51 - 132/76)  RR: 18 (12-30-20 @ 10:58) (18 - 18)  SpO2: 100% (12-29-20 @ 20:06) (99% - 100%)    PHYSICAL EXAM:  GENERAL: AAOx3, no acute distress.  HEAD:  Atraumatic, Normocephalic  EYES: conjunctiva and sclera clear  NECK: Supple, No thyromegaly   CHEST/LUNG: Clear to auscultation bilaterally; No wheeze, rhonchi, or rales  HEART: Regular rate and rhythm; normal S1, S2, No murmurs.  ABDOMEN: Soft, +epigastric tenderness, nondistended; Bowel sounds present, no abdominal bruit, masses, or hepatosplenomegaly  NEUROLOGY: No asterixis or tremor  SKIN: Intact, no jaundice          LABS:  12-30    139  |  106  |  8<L>  ----------------------------<  74  3.8   |  24  |  0.6<L>    Ca    8.3<L>      30 Dec 2020 08:27  Mg     2.1     12-29    TPro  5.8<L>  /  Alb  3.6  /  TBili  0.6  /  DBili  x   /  AST  11  /  ALT  8   /  AlkPhos  55  12-30                          11.5   6.91  )-----------( 160      ( 30 Dec 2020 08:27 )             35.0     LIVER FUNCTIONS - ( 30 Dec 2020 08:27 )  Alb: 3.6 g/dL / Pro: 5.8 g/dL / ALK PHOS: 55 U/L / ALT: 8 U/L / AST: 11 U/L / GGT: x               IMAGING:    < from: CT Abdomen and Pelvis w/ IV Cont (12.29.20 @ 19:11) >  EXAM:  CT ABDOMEN AND PELVIS IC            PROCEDURE DATE:  12/29/2020            INTERPRETATION:  CLINICAL STATEMENT: Abdominal pain    TECHNIQUE: Contiguous axial CT images were obtained from the lower chest to the pubic symphysis utilizing 95 ccof Optiray nonionic contrast with 5 cc discarded.  Oral contrast not administered.  Reformatted images in the coronal and sagittal planes were acquired.    COMPARISON CT: None.    OTHER STUDIES USED FOR CORRELATION: None.      FINDINGS:    LOWER CHEST: Unremarkable.    HEPATOBILIARY: Unremarkable.    SPLEEN: Unremarkable.    PANCREAS: Unremarkable.    ADRENAL GLANDS: Unremarkable.    KIDNEYS: No hydronephrosis. Mild mid right hydroureter. No obstructive urolithiasis    ABDOMINOPELVIC NODES: Unremarkable.    PELVIC ORGANS: Right ovary located just inferior to the cecal base lateral to the ileal loops. Uterus slightly deviated to the right with unremarkable left adnexa.    PERITONEUM/MESENTERY/BOWEL: Prominent appendix measuring between 6.5 and8.5 mm with granular appearance at the tip (series 602 image 48).. There is surrounding inflammatory change along the cecal base with small reactive size lymph nodes (series 2 image 46). Please note that the right ovary is located lateral to the ileal loops and inferior to the cecal base/just inferior to the tip of the appendix (series 2 image 56, series 602 image 42)    BONES/SOFT TISSUES: Unremarkable.      IMPRESSION: Mild appendiceal enlargement with pericecal inflammatory change . Findings suggest early appendicitis    mild localized right mid hydroureter, may be secondary to regional inflammation/ileus pattern    Note the right ovary atypically positioned just inferior to the tip of inflamed appendix along the lateral conal fascia      Spoke with MARCELO PEREZ on 12/29/2020 8:10 PM              JOSE EDUARDO BAKER MD; Attending Radiologist  This document has been electronically signed. Dec 29 2020  8:10PM    < end of copied text >       Chief complaint/Reason for consult: epigastric pain    HPI:  ·   · H 41 y/o female with hx of GERD  presents to the ED with epigastric pain worsening over past 5 days. patient states she stopped taking her omeprazole a month ago. patient states prior to onset of pain, she had been drinking alcohol. patient denies any black or bloody stool or hematemesis . patient denies any dysuria or back pain. patient denies any sob, cp. patient describes pain as burning. no relief with restarting her medications or pepcid otc.   (29 Dec 2020 21:16)    GI Updates: 40yFemale [facility] GERD, hypothyroidism presents with epigastric pain worsening over the past five days worse with food but still persistent without food. Patient states she had intermittent ETOH use socially. Currently Patient denies nausea, vomiting, hematemesis, melena, blood in stool, diarrhea, constipation. +epigastric pain 5-6/10      PAST MEDICAL & SURGICAL HISTORY:   Hypothyroid    Raynaud disease    Graves disease    Gastritis          Family history:  FAMILY HISTORY:    No GI cancers in first or second degree relatives    Social History: No smoking. +occasional alcohol use. No illegal drug use.    Allergies:   Cipro (Unknown)  penicillin (Unknown)      MEDICATIONS: Home Medications:  Bentyl 20 mg oral tablet: 1 tab(s) orally 4 times a day (29 Dec 2020 16:35)  meloxicam 15 mg oral tablet: 1 tab(s) orally once a day (29 Dec 2020 16:35)  omeprazole 40 mg oral delayed release capsule: 1 cap(s) orally once a day (29 Dec 2020 16:35)  pentoxifylline 400 mg oral tablet, extended release: 1 tab(s) orally 3 times a day (29 Dec 2020 16:35)  Synthroid 112 mcg (0.112 mg) oral tablet: 1 tab(s) orally once a day (29 Dec 2020 16:35)    MEDICATIONS  (STANDING):  cefoTEtan  IVPB 1 Gram(s) IV Intermittent every 12 hours  chlorhexidine 4% Liquid 1 Application(s) Topical <User Schedule>  dextrose 5% + sodium chloride 0.9%. 1000 milliLiter(s) (100 mL/Hr) IV Continuous <Continuous>  dicyclomine 20 milliGRAM(s) Oral four times a day before meals  levothyroxine 112 MICROGram(s) Oral daily  pantoprazole  Injectable 40 milliGRAM(s) IV Push once  pantoprazole  Injectable 40 milliGRAM(s) IV Push daily  pentoxifylline 400 milliGRAM(s) Oral three times a day    MEDICATIONS  (PRN):  acetaminophen   Tablet .. 650 milliGRAM(s) Oral every 6 hours PRN Mild Pain (1 - 3)  morphine  - Injectable 2 milliGRAM(s) IV Push every 4 hours PRN Moderate Pain (4 - 6)        REVIEW OF SYSTEMS  General:  No weight loss, fevers, or chills.  Eyes:  No reported pain or visual changes  ENT:  No sore throat or runny nose.  NECK: No stiffness or lymphadenopathy  CV:  No chest pain or palpitations.  Resp:  No shortness of breath, cough, wheezing or hemoptysis  GI:  No abdominal pain, nausea, vomiting, dysphagia, diarrhea or constipation. No rectal bleeding, melena, or hematemesis.  Muscle:  No aches or weakness  Neuro:  No tingling, numbness       VITALS:   T(F): 97.3 (12-30-20 @ 10:58), Max: 97.8 (12-29-20 @ 16:31)  HR: 80 (12-30-20 @ 10:58) (69 - 91)  BP: 88/51 (12-30-20 @ 10:58) (88/51 - 132/76)  RR: 18 (12-30-20 @ 10:58) (18 - 18)  SpO2: 100% (12-29-20 @ 20:06) (99% - 100%)    PHYSICAL EXAM:  GENERAL: AAOx3, no acute distress.  HEAD:  Atraumatic, Normocephalic  EYES: conjunctiva and sclera clear  NECK: Supple, No thyromegaly   CHEST/LUNG: Clear to auscultation bilaterally; No wheeze, rhonchi, or rales  HEART: Regular rate and rhythm; normal S1, S2, No murmurs.  ABDOMEN: Soft, +epigastric tenderness, nondistended; Bowel sounds present, no abdominal bruit, masses, or hepatosplenomegaly  NEUROLOGY: No asterixis or tremor  SKIN: Intact, no jaundice          LABS:  12-30    139  |  106  |  8<L>  ----------------------------<  74  3.8   |  24  |  0.6<L>    Ca    8.3<L>      30 Dec 2020 08:27  Mg     2.1     12-29    TPro  5.8<L>  /  Alb  3.6  /  TBili  0.6  /  DBili  x   /  AST  11  /  ALT  8   /  AlkPhos  55  12-30                          11.5   6.91  )-----------( 160      ( 30 Dec 2020 08:27 )             35.0     LIVER FUNCTIONS - ( 30 Dec 2020 08:27 )  Alb: 3.6 g/dL / Pro: 5.8 g/dL / ALK PHOS: 55 U/L / ALT: 8 U/L / AST: 11 U/L / GGT: x               IMAGING:    < from: CT Abdomen and Pelvis w/ IV Cont (12.29.20 @ 19:11) >  EXAM:  CT ABDOMEN AND PELVIS IC            PROCEDURE DATE:  12/29/2020            INTERPRETATION:  CLINICAL STATEMENT: Abdominal pain    TECHNIQUE: Contiguous axial CT images were obtained from the lower chest to the pubic symphysis utilizing 95 ccof Optiray nonionic contrast with 5 cc discarded.  Oral contrast not administered.  Reformatted images in the coronal and sagittal planes were acquired.    COMPARISON CT: None.    OTHER STUDIES USED FOR CORRELATION: None.      FINDINGS:    LOWER CHEST: Unremarkable.    HEPATOBILIARY: Unremarkable.    SPLEEN: Unremarkable.    PANCREAS: Unremarkable.    ADRENAL GLANDS: Unremarkable.    KIDNEYS: No hydronephrosis. Mild mid right hydroureter. No obstructive urolithiasis    ABDOMINOPELVIC NODES: Unremarkable.    PELVIC ORGANS: Right ovary located just inferior to the cecal base lateral to the ileal loops. Uterus slightly deviated to the right with unremarkable left adnexa.    PERITONEUM/MESENTERY/BOWEL: Prominent appendix measuring between 6.5 and8.5 mm with granular appearance at the tip (series 602 image 48).. There is surrounding inflammatory change along the cecal base with small reactive size lymph nodes (series 2 image 46). Please note that the right ovary is located lateral to the ileal loops and inferior to the cecal base/just inferior to the tip of the appendix (series 2 image 56, series 602 image 42)    BONES/SOFT TISSUES: Unremarkable.      IMPRESSION: Mild appendiceal enlargement with pericecal inflammatory change . Findings suggest early appendicitis    mild localized right mid hydroureter, may be secondary to regional inflammation/ileus pattern    Note the right ovary atypically positioned just inferior to the tip of inflamed appendix along the lateral conal fascia      Spoke with MARCELO PEREZ on 12/29/2020 8:10 PM              JOSE EDUARDO BAKER MD; Attending Radiologist  This document has been electronically signed. Dec 29 2020  8:10PM    < end of copied text >      < from: US Abdomen Limited (12.30.20 @ 13:10) >    EXAM:  US ABDOMEN LIMITED            PROCEDURE DATE:  12/30/2020            INTERPRETATION:  CLINICAL INFORMATION: Right upper quadrant pain.    COMPARISON: CT scan of the abdomen and pelvis from the prior day.    TECHNIQUE: Sonography of the rightupper quadrant.    FINDINGS:    Liver: Within normal limits.  Bile ducts: Normal caliber. Common bile duct measures 4 (mm).  Gallbladder: Within normal limits.  Pancreas: Visualized portions are within normal limits.  Right kidney: 9.9 cm. No hydronephrosis.  Ascites: None.  IVC: Visualized portions are within normal limits.    IMPRESSION:    Normal right upper quadrant abdominal ultrasound.                SHAHLA HICKS MD; Attending Interventional Radiologist  This document has been electronically signed. Dec 30 2020  1:32PM    < end of copied text >

## 2020-12-30 NOTE — PROGRESS NOTE ADULT - SUBJECTIVE AND OBJECTIVE BOX
S: Pt still c/o epigastric "pressure-like" pain which occasionally radiates to RUQ area. Still with mild RLQ pain (she has chronic RLQ pain due to GYN issue ("twisted" right ovary). ALso C/O HA, likely because she hasn't eaten. She had US abdomen on 20 - negative for gallstones  O; Vital Signs Last 24 Hrs  T(C): 35.9 (30 Dec 2020 01:19), Max: 36.6 (29 Dec 2020 16:31)  T(F): 96.6 (30 Dec 2020 01:19), Max: 97.8 (29 Dec 2020 16:31)  HR: 80 (30 Dec 2020 07:37) (69 - 91)  BP: 99/57 (30 Dec 2020 07:37) (89/51 - 132/76)  BP(mean): --  RR: 18 (30 Dec 2020 01:19) (18 - 18)  SpO2: 100% (29 Dec 2020 20:06) (99% - 100%)    MEDICATIONS  (STANDING):  cefoTEtan  IVPB 1 Gram(s) IV Intermittent every 12 hours  chlorhexidine 4% Liquid 1 Application(s) Topical <User Schedule>  dicyclomine 20 milliGRAM(s) Oral four times a day before meals  levothyroxine 112 MICROGram(s) Oral daily  ondansetron Injectable 4 milliGRAM(s) IV Push once  pantoprazole  Injectable 40 milliGRAM(s) IV Push once  pantoprazole  Injectable 40 milliGRAM(s) IV Push daily  pentoxifylline 400 milliGRAM(s) Oral three times a day  sodium chloride 0.9% with potassium chloride 20 mEq/L 1000 milliLiter(s) (100 mL/Hr) IV Continuous <Continuous>    MEDICATIONS  (PRN):  morphine  - Injectable 2 milliGRAM(s) IV Push every 4 hours PRN Moderate Pain (4 - 6)    EXAM;  lungs: cta  cvs: s1s2  abd: soft, ND, +epigastric/RIUQ tenderness, mild RLQ tenderness    Labs:  CAPILLARY BLOOD GLUCOSE                          11.5   6.91  )-----------( 160      ( 30 Dec 2020 08:27 )             35.0       Auto Neutrophil %: 80.4 % (20 @ 17:30)  Auto Immature Granulocyte %: 0.3 % (20 @ 17:30)    BMP: pending    Urinalysis Basic - ( 29 Dec 2020 17:30 )    Color: Yellow / Appearance: Clear / S.025 / pH: x  Gluc: x / Ketone: Negative  / Bili: Negative / Urobili: 0.2 mg/dL   Blood: x / Protein: Negative mg/dL / Nitrite: Negative   Leuk Esterase: Negative / RBC: 1-2 /HPF / WBC 1-2 /HPF   Sq Epi: x / Non Sq Epi: Few /HPF / Bacteria: x

## 2020-12-31 ENCOUNTER — TRANSCRIPTION ENCOUNTER (OUTPATIENT)
Age: 40
End: 2020-12-31

## 2020-12-31 VITALS
RESPIRATION RATE: 18 BRPM | TEMPERATURE: 97 F | HEART RATE: 70 BPM | SYSTOLIC BLOOD PRESSURE: 91 MMHG | DIASTOLIC BLOOD PRESSURE: 53 MMHG

## 2020-12-31 LAB
ALBUMIN SERPL ELPH-MCNC: 3.1 G/DL — LOW (ref 3.5–5.2)
ALP SERPL-CCNC: 46 U/L — SIGNIFICANT CHANGE UP (ref 30–115)
ALT FLD-CCNC: 7 U/L — SIGNIFICANT CHANGE UP (ref 0–41)
ANION GAP SERPL CALC-SCNC: 7 MMOL/L — SIGNIFICANT CHANGE UP (ref 7–14)
AST SERPL-CCNC: 9 U/L — SIGNIFICANT CHANGE UP (ref 0–41)
BILIRUB SERPL-MCNC: 0.3 MG/DL — SIGNIFICANT CHANGE UP (ref 0.2–1.2)
BUN SERPL-MCNC: 6 MG/DL — LOW (ref 10–20)
CALCIUM SERPL-MCNC: 8 MG/DL — LOW (ref 8.5–10.1)
CHLORIDE SERPL-SCNC: 109 MMOL/L — SIGNIFICANT CHANGE UP (ref 98–110)
CO2 SERPL-SCNC: 23 MMOL/L — SIGNIFICANT CHANGE UP (ref 17–32)
CREAT SERPL-MCNC: 0.6 MG/DL — LOW (ref 0.7–1.5)
GLUCOSE SERPL-MCNC: 85 MG/DL — SIGNIFICANT CHANGE UP (ref 70–99)
HCT VFR BLD CALC: 31.8 % — LOW (ref 37–47)
HGB BLD-MCNC: 10.3 G/DL — LOW (ref 12–16)
MCHC RBC-ENTMCNC: 29.8 PG — SIGNIFICANT CHANGE UP (ref 27–31)
MCHC RBC-ENTMCNC: 32.4 G/DL — SIGNIFICANT CHANGE UP (ref 32–37)
MCV RBC AUTO: 91.9 FL — SIGNIFICANT CHANGE UP (ref 81–99)
NRBC # BLD: 0 /100 WBCS — SIGNIFICANT CHANGE UP (ref 0–0)
PLATELET # BLD AUTO: 148 K/UL — SIGNIFICANT CHANGE UP (ref 130–400)
POTASSIUM SERPL-MCNC: 3.9 MMOL/L — SIGNIFICANT CHANGE UP (ref 3.5–5)
POTASSIUM SERPL-SCNC: 3.9 MMOL/L — SIGNIFICANT CHANGE UP (ref 3.5–5)
PROT SERPL-MCNC: 5.3 G/DL — LOW (ref 6–8)
RBC # BLD: 3.46 M/UL — LOW (ref 4.2–5.4)
RBC # FLD: 12.4 % — SIGNIFICANT CHANGE UP (ref 11.5–14.5)
SODIUM SERPL-SCNC: 139 MMOL/L — SIGNIFICANT CHANGE UP (ref 135–146)
WBC # BLD: 5.62 K/UL — SIGNIFICANT CHANGE UP (ref 4.8–10.8)
WBC # FLD AUTO: 5.62 K/UL — SIGNIFICANT CHANGE UP (ref 4.8–10.8)

## 2020-12-31 PROCEDURE — 99232 SBSQ HOSP IP/OBS MODERATE 35: CPT

## 2020-12-31 RX ORDER — MELOXICAM 15 MG/1
1 TABLET ORAL
Qty: 0 | Refills: 0 | DISCHARGE

## 2020-12-31 RX ORDER — ACETAMINOPHEN 500 MG
2 TABLET ORAL
Qty: 0 | Refills: 0 | DISCHARGE
Start: 2020-12-31

## 2020-12-31 RX ADMIN — Medication 20 MILLIGRAM(S): at 06:06

## 2020-12-31 RX ADMIN — PANTOPRAZOLE SODIUM 40 MILLIGRAM(S): 20 TABLET, DELAYED RELEASE ORAL at 06:06

## 2020-12-31 RX ADMIN — CHLORHEXIDINE GLUCONATE 1 APPLICATION(S): 213 SOLUTION TOPICAL at 04:43

## 2020-12-31 RX ADMIN — Medication 100 GRAM(S): at 04:43

## 2020-12-31 RX ADMIN — Medication 112 MICROGRAM(S): at 05:07

## 2020-12-31 RX ADMIN — Medication 400 MILLIGRAM(S): at 15:49

## 2020-12-31 RX ADMIN — Medication 20 MILLIGRAM(S): at 11:26

## 2020-12-31 RX ADMIN — Medication 400 MILLIGRAM(S): at 06:07

## 2020-12-31 NOTE — PROGRESS NOTE ADULT - SUBJECTIVE AND OBJECTIVE BOX
S: Epigastric pain is much improved; no RLQ pain; has been on Protonix BD; also tolerating full liquids  O;Vital Signs Last 24 Hrs  T(C): 36.1 (31 Dec 2020 05:00), Max: 36.5 (30 Dec 2020 21:08)  T(F): 97 (31 Dec 2020 05:00), Max: 97.7 (30 Dec 2020 21:08)  HR: 65 (31 Dec 2020 08:39) (61 - 82)  BP: 102/61 (31 Dec 2020 08:39) (82/46 - 102/61)  BP(mean): --  RR: 18 (31 Dec 2020 05:00) (18 - 18)    MEDICATIONS  (STANDING):  cefoTEtan  IVPB 1 Gram(s) IV Intermittent every 12 hours  chlorhexidine 4% Liquid 1 Application(s) Topical <User Schedule>  dicyclomine 20 milliGRAM(s) Oral four times a day before meals  levothyroxine 112 MICROGram(s) Oral daily  pantoprazole  Injectable 40 milliGRAM(s) IV Push two times a day  pentoxifylline 400 milliGRAM(s) Oral three times a day  sodium chloride 0.9%. 1000 milliLiter(s) (100 mL/Hr) IV Continuous <Continuous>    MEDICATIONS  (PRN):  acetaminophen   Tablet .. 650 milliGRAM(s) Oral every 6 hours PRN Mild Pain (1 - 3)  morphine  - Injectable 2 milliGRAM(s) IV Push every 4 hours PRN Moderate Pain (4 - 6)      EXAM:  lungs: cta  cvs: s1s2  abd: soft, mild epigastric tenderness    Labs:  CAPILLARY BLOOD GLUCOSE                              10.3   5.62  )-----------( 148      ( 31 Dec 2020 06:46 )             31.8             139  |  109  |  6<L>  ----------------------------<  85  3.9   |  23  |  0.6<L>      Calcium, Total Serum: 8.0 mg/dL (-20 @ 06:46)      LFTs:             5.3  | 0.3  | 9        ------------------[46      ( 31 Dec 2020 06:46 )  3.1  | x    | 7           Lipase:x      Amylase:x           Urinalysis Basic - ( 29 Dec 2020 17:30 )    Color: Yellow / Appearance: Clear / S.025 / pH: x  Gluc: x / Ketone: Negative  / Bili: Negative / Urobili: 0.2 mg/dL   Blood: x / Protein: Negative mg/dL / Nitrite: Negative   Leuk Esterase: Negative / RBC: 1-2 /HPF / WBC 1-2 /HPF   Sq Epi: x / Non Sq Epi: Few /HPF / Bacteria: x    Culture - Urine (collected 29 Dec 2020 17:30)  Source: .Urine Clean Catch (Midstream)  Final Report (30 Dec 2020 20:53):    <10,000 CFU/mL Normal Urogenital Linda     S: Epigastric pain is much improved; no RLQ pain; has been on Protonix BD; also tolerating full liquids  O;Vital Signs Last 24 Hrs  T(C): 36.1 (31 Dec 2020 05:00), Max: 36.5 (30 Dec 2020 21:08)  T(F): 97 (31 Dec 2020 05:00), Max: 97.7 (30 Dec 2020 21:08)  HR: 65 (31 Dec 2020 08:39) (61 - 82)  BP: 102/61 (31 Dec 2020 08:39) (82/46 - 102/61)  BP(mean): --  RR: 18 (31 Dec 2020 05:00) (18 - 18)    MEDICATIONS  (STANDING):  cefoTEtan  IVPB 1 Gram(s) IV Intermittent every 12 hours  chlorhexidine 4% Liquid 1 Application(s) Topical <User Schedule>  dicyclomine 20 milliGRAM(s) Oral four times a day before meals  levothyroxine 112 MICROGram(s) Oral daily  pantoprazole  Injectable 40 milliGRAM(s) IV Push two times a day  pentoxifylline 400 milliGRAM(s) Oral three times a day  sodium chloride 0.9%. 1000 milliLiter(s) (100 mL/Hr) IV Continuous <Continuous>    MEDICATIONS  (PRN):  acetaminophen   Tablet .. 650 milliGRAM(s) Oral every 6 hours PRN Mild Pain (1 - 3)  morphine  - Injectable 2 milliGRAM(s) IV Push every 4 hours PRN Moderate Pain (4 - 6)      EXAM:  lungs: cta  cvs: s1s2  abd: soft, mild epigastric tenderness; no RLQ tenderness    Labs:  CAPILLARY BLOOD GLUCOSE                              10.3   5.62  )-----------( 148      ( 31 Dec 2020 06:46 )             31.8             139  |  109  |  6<L>  ----------------------------<  85  3.9   |  23  |  0.6<L>      Calcium, Total Serum: 8.0 mg/dL (-20 @ 06:46)      LFTs:             5.3  | 0.3  | 9        ------------------[46      ( 31 Dec 2020 06:46 )  3.1  | x    | 7           Lipase:x      Amylase:x           Urinalysis Basic - ( 29 Dec 2020 17:30 )    Color: Yellow / Appearance: Clear / S.025 / pH: x  Gluc: x / Ketone: Negative  / Bili: Negative / Urobili: 0.2 mg/dL   Blood: x / Protein: Negative mg/dL / Nitrite: Negative   Leuk Esterase: Negative / RBC: 1-2 /HPF / WBC 1-2 /HPF   Sq Epi: x / Non Sq Epi: Few /HPF / Bacteria: x    Culture - Urine (collected 29 Dec 2020 17:30)  Source: .Urine Clean Catch (Midstream)  Final Report (30 Dec 2020 20:53):    <10,000 CFU/mL Normal Urogenital Linda

## 2020-12-31 NOTE — DISCHARGE NOTE PROVIDER - CARE PROVIDER_API CALL
Hafsa Yun)  Internal Medicine  [address] [place] [id number]  Phone: [phone]  Fax: [phone]  Established Patient  Follow Up Time: 1 week

## 2020-12-31 NOTE — PROGRESS NOTE ADULT - ATTENDING COMMENTS
above noted discussed case with surgical resident and PA abdomen soft tender epigastrim GI follow up noted repeat ct scan noted
Epigastric pain resolved. F/U as outpatient.
above noted discussed case with surgical resident and pt abdomen soft no RLQ tenderness mild tenderness epegastrium

## 2020-12-31 NOTE — DISCHARGE NOTE PROVIDER - NSDCCPCAREPLAN_GEN_ALL_CORE_FT
PRINCIPAL DISCHARGE DIAGNOSIS  Diagnosis: Gastric pain  Assessment and Plan of Treatment: not appendicitis   low fat low bland diet   c/w ppi   and f/u with GI

## 2020-12-31 NOTE — PROGRESS NOTE ADULT - REASON FOR ADMISSION
40y Female complaining of abdominal pain.

## 2020-12-31 NOTE — DISCHARGE NOTE NURSING/CASE MANAGEMENT/SOCIAL WORK - PATIENT PORTAL LINK FT
You can access the FollowMyHealth Patient Portal offered by [facility] by registering at the following website: [email or web address]. By joining [facility]’s FollowMyHealth portal, you will also be able to view your health information using other applications (apps) compatible with our system.

## 2020-12-31 NOTE — DISCHARGE NOTE PROVIDER - HOSPITAL COURSE
39 y/o female with hx of GERD  presents to the ED with epigastric pain worsening over past 5 days. patient states she stopped taking her omeprazole a month ago. patient states prior to onset of pain, she had been drinking alcohol. patient denies any black or bloody stool or hematemesis . patient denies any dysuria or back pain. patient denies any sob, cp. patient describes pain as burning. no relief with restarting her medications or pepcid otc.    < from: CT Abdomen and Pelvis w/ IV Cont (12.29.20 @ 19:11) >      IMPRESSION: Mild appendiceal enlargement with pericecal inflammatory change . Findings suggest early appendicitis    mild localized right mid hydroureter, may be secondary to regional inflammation/ileus pattern    Note the right ovary atypically positioned just inferior to the tip of inflamed appendix along the lateral conal fascia      < end of copied text >      admitted surgery team     repeat CT     < from: CT Abdomen and Pelvis w/ Oral Cont and w/ IV Cont (12.30.20 @ 16:37) >    IMPRESSION:    On the current scan there appears to be thickening of the wall of the prepyloric region of the gastric antrum compatible with gastritis. No pneumoperitoneum.    The appendix is normal in appearance, with a small amount of contrast refluxing into the appendix.    GI consulted   Epigastric pain  ddx gastritis, PUD, esophagitis  Rec  -Upgrade PPI to BID  -Advised ETOH cessation  -Obtain RUQ ultrasound appreciated and normal gallbladder WNL and CBD 4mm  -Given recent EGD would be prudent to review records once obtained and await improvement on conservative measures like PPI BID and GERD diet however if patient does not improve we can consider EGD  -Follow-up with patient's private GI Dr. Lange    Problem 2-Mild appendiceal enlargement with pericecal inflammatory change . Findings suggest early appendicitis Note the right ovary atypically positioned just inferior to the tip of inflamed appendix along the lateral conal fascia  There is surrounding inflammatory change along the cecal base with small reactive size lymph nodes   Rec  - Care as per primary team   -Outpatient Colonoscopy with Dr. Lange patient's private GI    Problem 3-mild localized right mid hydroureter, may be secondary to regional inflammation/ileus pattern  Rec  - Care as per primary team     diet advanced and tolerated   d/c home   with close f/u with own gi and low fat diet

## 2020-12-31 NOTE — PROGRESS NOTE ADULT - ASSESSMENT
40yFemale pmh GERD, hypothyroidism presents with epigastric pain worsening over the past five days worse with food but still persistent without food. Patient states she had intermittent ETOH use socially. Patient reports last EGD 9/2020 reportedly normal and last colonoscopy last year reportedly normal as well by patient by. Dr Lange in [place].      Problem 1-Epigastric pain  ddx gastritis, PUD, esophagitis  Rec    -Upgrade PPI to BID  -Advised ETOH cessation  - RUQ ultrasound appreciated and normal gallbladder WNL and CBD 4mm  -Given recent EGD would be prudent to review records once obtained and await improvement on conservative measures like PPI BID and GERD diet however if patient does not improve we can consider EGD  -Follow-up with patient's private GI Dr. Lange    Problem 2-Mild appendiceal enlargement with pericecal inflammatory change . Findings suggest early appendicitis Note the right ovary atypically positioned just inferior to the tip of inflamed appendix along the lateral conal fascia  There is surrounding inflammatory change along the cecal base with small reactive size lymph nodes   Rec  - Care as per primary team   -Outpatient Colonoscopy with Dr. Lange patient's private GI    Problem 3-mild localized right mid hydroureter, may be secondary to regional inflammation/ileus pattern  Rec  - Care as per primary team      40yFemale pmh GERD, hypothyroidism presents with epigastric pain worsening over the past five days worse with food but still persistent without food. Patient states she had intermittent ETOH use socially. Patient reports last EGD 9/2020 reportedly normal and last colonoscopy last year reportedly normal as well by patient by. Dr Lange in [place].      Dr. Lange, GI  EGD Results   5/2020   Impressions   -chronic gastritis of antrum without bleeding   -Path without HP and without dysplasia or malignancy or evidence of celiac disease   -GE junction path negative for IM, dysplasia or malignancy     Colonoscopy 9/2018 with excellent prep  Impressions   -small Hemorrhoids other than that, unremarkable   -Ileal biopsies taken without diagnostic abnormalities on path  -Rectal biopsies negative for cryptitis, crypt abscess, or granulomas, no evidence of microscopic colitis, dysplasia or malignancy.      Problem 1-Epigastric pain--->resolved   ddx gastritis, PUD, esophagitis  Rec  -On recent CT scan there appears to be thickening of the wall of the prepyloric region of the gastric antrum compatible with gastritis. No pneumoperitoneum likely reflective of antral gastritis as above from EGD  -Keep PPI BID and after outpatient followup with Dr. Lange can taper PPI as needed and consider outpatient EGD if needed   -Advised ETOH cessation  -RUQ ultrasound appreciated and normal gallbladder WNL and CBD 4mm  -Follow-up with patient's private GI Dr. Lange    Problem 2-Mild appendiceal enlargement with pericecal inflammatory change . Findings suggest early appendicitis Note the right ovary atypically positioned just inferior to the tip of inflamed appendix along the lateral conal fascia  There is surrounding inflammatory change along the cecal base with small reactive size lymph nodes   Rec  - Care as per primary team   -Outpatient Colonoscopy with Dr. Lange patient's private GI    Problem 3-mild localized right mid hydroureter, may be secondary to regional inflammation/ileus pattern  Rec  - Care as per primary team      40yFemale pmh GERD, hypothyroidism presents with epigastric pain worsening over the past five days worse with food but still persistent without food. Patient states she had intermittent ETOH use socially. Patient reports last EGD 9/2020 reportedly normal and last colonoscopy last year reportedly normal as well by patient by. Dr Lange in [place].      Dr. Lange, GI  EGD Results   5/2020   Impressions   -chronic gastritis of antrum without bleeding   -Path without HP and without dysplasia or malignancy or evidence of celiac disease   -GE junction path negative for IM, dysplasia or malignancy     Colonoscopy 9/2018 with excellent prep  Impressions   -small Hemorrhoids other than that, unremarkable   -Ileal biopsies taken without diagnostic abnormalities on path  -Rectal biopsies negative for cryptitis, crypt abscess, or granulomas, no evidence of microscopic colitis, dysplasia or malignancy.    Problem 1-Epigastric pain--->resolved   ddx gastritis, PUD, esophagitis  Rec  -On recent CT scan there appears to be thickening of the wall of the prepyloric region of the gastric antrum compatible with gastritis. No pneumoperitoneum likely reflective of antral gastritis as above from EGD  -Keep PPI BID and after outpatient followup with Dr. Lange can taper PPI as needed and consider outpatient EGD if needed   -Advised ETOH cessation  -RUQ ultrasound appreciated and normal gallbladder WNL and CBD 4mm  -Follow-up with patient's private GI Dr. Lange    Problem 2-Mild appendiceal enlargement with pericecal inflammatory change . Findings suggest early appendicitis Note the right ovary atypically positioned just inferior to the tip of inflamed appendix along the lateral conal fascia  There is surrounding inflammatory change along the cecal base with small reactive size lymph nodes   Rec  - Care as per primary team   -Outpatient Colonoscopy with Dr. Lange patient's private GI    Problem 3-mild localized right mid hydroureter, may be secondary to regional inflammation/ileus pattern  Rec  - Care as per primary team     Recall GI if needed

## 2020-12-31 NOTE — PROGRESS NOTE ADULT - SUBJECTIVE AND OBJECTIVE BOX
40yFemale  Being followed for epigastric pain  Interval history: Patient denies nausea, vomiting, hematemesis, melena, blood in stool, diarrhea, constipation, abdominal pain. Patient comfortable feeling significantly better after PPI BID patient tolerating full liquid diet      PAST MEDICAL & SURGICAL HISTORY:   Hypothyroid    Raynaud disease    Graves disease    Gastritis            Social History: No smoking. +intermittent alcohol use. No illegal drug use.            MEDICATIONS  (STANDING):  cefoTEtan  IVPB 1 Gram(s) IV Intermittent every 12 hours  chlorhexidine 4% Liquid 1 Application(s) Topical <User Schedule>  dicyclomine 20 milliGRAM(s) Oral four times a day before meals  levothyroxine 112 MICROGram(s) Oral daily  pantoprazole  Injectable 40 milliGRAM(s) IV Push two times a day  pentoxifylline 400 milliGRAM(s) Oral three times a day  sodium chloride 0.9%. 1000 milliLiter(s) (100 mL/Hr) IV Continuous <Continuous>    MEDICATIONS  (PRN):  acetaminophen   Tablet .. 650 milliGRAM(s) Oral every 6 hours PRN Mild Pain (1 - 3)  morphine  - Injectable 2 milliGRAM(s) IV Push every 4 hours PRN Moderate Pain (4 - 6)      Allergies:   Cipro (Unknown)  penicillin (Unknown)          REVIEW OF SYSTEMS:  General:  No weight loss, fevers, or chills.  Eyes:  No reported pain or visual changes  ENT:  No sore throat or runny nose.  NECK: No stiffness   CV:  No chest pain or palpitations.  Resp:  No shortness of breath, cough  GI:  No abdominal pain, nausea, vomiting, dysphagia, diarrhea or constipation. No rectal bleeding, melena, or hematemesis.  Muscle:  No aches or weakness  Neuro:  No tingling, numbness           VITAL SIGNS:   T(F): 97 (12-31-20 @ 05:00), Max: 97.7 (12-30-20 @ 21:08)  HR: 65 (12-31-20 @ 08:39) (61 - 82)  BP: 102/61 (12-31-20 @ 08:39) (82/46 - 102/61)  RR: 18 (12-31-20 @ 05:00) (18 - 18)  SpO2: --    PHYSICAL EXAM:  GENERAL: AAOx3, no acute distress.  HEAD:  Atraumatic, Normocephalic  EYES: conjunctiva and sclera clear  NECK: Supple, no JVD or thyromegaly  CHEST/LUNG: Clear to auscultation bilaterally; No wheeze, rhonchi, or rales  HEART: Regular rate and rhythm; normal S1, S2, No murmurs.  ABDOMEN: Soft, nontender, nondistended; Bowel sounds present, no abdominal bruit, masses, or hepatosplenomegaly  NEUROLOGY: No asterixis or tremor.   SKIN: Intact, no jaundice            LABS:                        10.3   5.62  )-----------( 148      ( 31 Dec 2020 06:46 )             31.8     12-31    139  |  109  |  6<L>  ----------------------------<  85  3.9   |  23  |  0.6<L>    Ca    8.0<L>      31 Dec 2020 06:46  Mg     2.1     12-29    TPro  5.3<L>  /  Alb  3.1<L>  /  TBili  0.3  /  DBili  x   /  AST  9   /  ALT  7   /  AlkPhos  46  12-31    LIVER FUNCTIONS - ( 31 Dec 2020 06:46 )  Alb: 3.1 g/dL / Pro: 5.3 g/dL / ALK PHOS: 46 U/L / ALT: 7 U/L / AST: 9 U/L / GGT: x               IMAGING:      < from: CT Abdomen and Pelvis w/ Oral Cont and w/ IV Cont (12.30.20 @ 16:37) >  EXAM:  CT ABDOMEN AND PELVIS OC IC            PROCEDURE DATE:  12/30/2020            INTERPRETATION:  REASON FOR EXAM / CLINICAL STATEMENT: Epigastric abdominal pain   PMHx of GERD, hypothyroidism    TECHNIQUE: Contiguous axial CT images were obtained from the lower chest to the pubic symphysis with intravenous contrast.   Reformatted images in the coronal and sagittal planes were acquired.    COMPARISON CT:    OTHER STUDIES USED FOR CORRELATION: None.        FINDINGS    LOWER CHEST: The lung bases are clear. No pleural or pericardial effusion.    HEPATIC: The liver is normal in appearance with no evidence of mass or bile duct dilatation. The portal vein is patent. The hepatic veins are opacified.    BILIARY: No calcified gallstones are noted.    SPLEEN: Unremarkable.    PANCREAS: The pancreas is normal in size and configuration. No evidence of mass or pancreatitis.    ADRENAL GLANDS: Unremarkable.    KIDNEYS: There is symmetric bilateral renal enhancement. No evidence of hydronephrosis, calcified stones, or solid mass.    ABDOMINOPELVIC NODES: Unremarkable.    PELVIC ORGANS: Uterus and adnexa are unchanged. A small amount of fluid is noted within the cul-de-sac. No evidence of pelvic mass, lymphadenopathy, or fluid collection.    PERITONEUM/MESENTERY/BOWEL: On the current scan there appears to be thickening of the wall of the prepyloric region of the gastric antrum compatible with gastritis. No evidence of bowel obstruction. No pneumoperitoneum. The appendix is normal in appearance, with a small amount of contrast refluxing into the appendix.    BONES/SOFT TISSUES: Unremarkable    OTHER: Normal caliber aorta.      IMPRESSION:    On the current scan there appears to be thickening of the wall of the prepyloric region of the gastric antrum compatible with gastritis. No pneumoperitoneum.    The appendix is normal in appearance, with a small amount of contrast refluxing into the appendix.                MCKENNA RHODES MD; Attending Interventional Radiologist  This documenthas been electronically signed. Dec 30 2020  5:26PM    < end of copied text >

## 2020-12-31 NOTE — DISCHARGE NOTE PROVIDER - NSDCMRMEDTOKEN_GEN_ALL_CORE_FT
Bentyl 20 mg oral tablet: 1 tab(s) orally 4 times a day  omeprazole 40 mg oral delayed release capsule: 1 cap(s) orally once a day  pentoxifylline 400 mg oral tablet, extended release: 1 tab(s) orally 3 times a day  Synthroid 112 mcg (0.112 mg) oral tablet: 1 tab(s) orally once a day  Tylenol 325 mg oral tablet: 2 tab(s) orally every 6 hours, As needed, Mild Pain (1 - 3)

## 2021-01-01 LAB
SARS-COV-2 IGG SERPL QL IA: NEGATIVE — SIGNIFICANT CHANGE UP
SARS-COV-2 IGM SERPL IA-ACNC: 0.21 INDEX — SIGNIFICANT CHANGE UP

## 2021-01-05 DIAGNOSIS — Z88.0 ALLERGY STATUS TO PENICILLIN: ICD-10-CM

## 2021-01-05 DIAGNOSIS — I73.00 RAYNAUD'S SYNDROME WITHOUT GANGRENE: ICD-10-CM

## 2021-01-05 DIAGNOSIS — Z88.1 ALLERGY STATUS TO OTHER ANTIBIOTIC AGENTS STATUS: ICD-10-CM

## 2021-01-05 DIAGNOSIS — K29.50 UNSPECIFIED CHRONIC GASTRITIS WITHOUT BLEEDING: ICD-10-CM

## 2021-01-05 DIAGNOSIS — R10.9 UNSPECIFIED ABDOMINAL PAIN: ICD-10-CM

## 2021-01-05 DIAGNOSIS — K21.9 GASTRO-ESOPHAGEAL REFLUX DISEASE WITHOUT ESOPHAGITIS: ICD-10-CM

## 2021-01-05 DIAGNOSIS — E05.00 THYROTOXICOSIS WITH DIFFUSE GOITER WITHOUT THYROTOXIC CRISIS OR STORM: ICD-10-CM

## 2021-02-19 PROBLEM — E05.00 THYROTOXICOSIS WITH DIFFUSE GOITER WITHOUT THYROTOXIC CRISIS OR STORM: Chronic | Status: ACTIVE | Noted: 2020-12-29

## 2021-02-19 PROBLEM — E03.9 HYPOTHYROIDISM, UNSPECIFIED: Chronic | Status: ACTIVE | Noted: 2020-12-29

## 2021-02-19 PROBLEM — I73.00 RAYNAUD'S SYNDROME WITHOUT GANGRENE: Chronic | Status: ACTIVE | Noted: 2020-12-29

## 2021-02-19 PROBLEM — K29.70 GASTRITIS, UNSPECIFIED, WITHOUT BLEEDING: Chronic | Status: ACTIVE | Noted: 2020-12-29

## 2021-02-25 ENCOUNTER — NON-APPOINTMENT (OUTPATIENT)
Age: 41
End: 2021-02-25

## 2021-02-25 DIAGNOSIS — B97.21 SARS-ASSOCIATED CORONAVIRUS AS THE CAUSE OF DISEASES CLASSIFIED ELSEWHERE: ICD-10-CM

## 2021-02-25 DIAGNOSIS — J06.9 ACUTE UPPER RESPIRATORY INFECTION, UNSPECIFIED: ICD-10-CM

## 2021-02-25 RX ORDER — PENTOXIFYLLINE 400 MG/1
400 TABLET, EXTENDED RELEASE ORAL
Refills: 0 | Status: ACTIVE | COMMUNITY

## 2021-02-25 RX ORDER — LEVOTHYROXINE SODIUM 100 UG/1
100 TABLET ORAL
Refills: 0 | Status: ACTIVE | COMMUNITY

## 2021-03-02 ENCOUNTER — APPOINTMENT (OUTPATIENT)
Dept: PULMONOLOGY | Facility: CLINIC | Age: 41
End: 2021-03-02

## 2021-09-21 ENCOUNTER — RESULT REVIEW (OUTPATIENT)
Age: 41
End: 2021-09-21

## 2021-09-21 ENCOUNTER — OUTPATIENT (OUTPATIENT)
Dept: OUTPATIENT SERVICES | Facility: HOSPITAL | Age: 41
LOS: 1 days | Discharge: HOME | End: 2021-09-21
Payer: COMMERCIAL

## 2021-09-21 DIAGNOSIS — M25.571 PAIN IN RIGHT ANKLE AND JOINTS OF RIGHT FOOT: ICD-10-CM

## 2021-09-21 DIAGNOSIS — M79.671 PAIN IN RIGHT FOOT: ICD-10-CM

## 2021-09-21 PROCEDURE — 73718 MRI LOWER EXTREMITY W/O DYE: CPT | Mod: 26,RT

## 2021-09-21 PROCEDURE — 73721 MRI JNT OF LWR EXTRE W/O DYE: CPT | Mod: 26,RT

## 2021-09-24 ENCOUNTER — RESULT REVIEW (OUTPATIENT)
Age: 41
End: 2021-09-24

## 2021-09-24 ENCOUNTER — OUTPATIENT (OUTPATIENT)
Dept: OUTPATIENT SERVICES | Facility: HOSPITAL | Age: 41
LOS: 1 days | Discharge: HOME | End: 2021-09-24
Payer: COMMERCIAL

## 2021-09-24 DIAGNOSIS — M79.672 PAIN IN LEFT FOOT: ICD-10-CM

## 2021-09-24 PROCEDURE — 73721 MRI JNT OF LWR EXTRE W/O DYE: CPT | Mod: 26,LT

## 2021-09-24 PROCEDURE — 73718 MRI LOWER EXTREMITY W/O DYE: CPT | Mod: 26,LT

## 2021-10-11 ENCOUNTER — TRANSCRIPTION ENCOUNTER (OUTPATIENT)
Age: 41
End: 2021-10-11

## 2021-12-12 NOTE — PATIENT PROFILE ADULT - FUNCTIONAL SCREEN CURRENT LEVEL: SWALLOWING (IF SCORE 2 OR MORE FOR ANY ITEM, CONSULT REHAB SERVICES), MLM)
Principal Discharge DX:	Suicidal ideation  Secondary Diagnosis:	Auditory hallucination  Secondary Diagnosis:	Polysubstance abuse   1 0 = swallows foods/liquids without difficulty

## 2022-02-28 NOTE — H&P ADULT - NSHPPOAPRESSUREULCER_GEN_ALL_CORE
Surgery Orthopedic History & Physical Note  Janessa Cain is a 83 y.o. female who is here to follow-up for follow-up right knee pain.  She was supposed to be here today for a presurgical discussion.  I saw a note from her primary care doctor yesterday that said her blood pressure is not well controlled.  They have changed her medication around and are having her follow-up for further evaluation.  I do not think she is medically optimized for surgery at this point and therefore I am going to postpone her operation until she can get cleared by her primary care doctor.  Also been determined that she may have sleep apnea which needs to be evaluated.  She also has chronic osteomyelitis of her left hip and is on antibiotics for that.  She recently had a COVID-19 infection and is being followed up for a trial of albuterol for her cough and shortness of breath.                     Current Outpatient Medications on File Prior to Visit   Medication Sig Dispense Refill   • valsartan-hydrochlorothiazide (DIOVAN-HCT) 160-12.5 MG per tablet Take 1 Tablet by mouth every day. 30 Tablet 3   • omeprazole (PRILOSEC) 20 MG delayed-release capsule Take 1 Capsule by mouth every day. 30 Capsule 1   • albuterol 108 (90 Base) MCG/ACT Aero Soln inhalation aerosol Inhale 2 Puffs every four hours as needed for Shortness of Breath. 1 Each 3   • ibuprofen (MOTRIN) 200 MG Tab Take 200 mg by mouth every 6 hours as needed.       • acetaminophen (TYLENOL) 325 MG Tab Take 650 mg by mouth every four hours as needed.       • traZODone (DESYREL) 50 MG Tab Take 1 Tablet by mouth 1 time a day as needed for Sleep. 30 Tablet 3   • meloxicam (MOBIC) 15 MG tablet TAKE ONE TABLET BY MOUTH DAILY 30 tablet 0   • amoxicillin-clavulanate (AUGMENTIN) 500-125 MG Tab Take 1 tablet by mouth 3 times a day.       • CALCIUM-VITAMIN D PO Take  by mouth. (Patient not taking: Reported on 11/17/2021)       • estradiol (ESTRACE) 0.5 MG tablet ESTRADIOL 0.5 MG  "TABS       • carbamazepine (CARBATROL) 200 MG CR capsule Take 1 Capsule by mouth 2 times a day.          No current facility-administered medications on file prior to visit.      Past Medical History        Past Medical History:   Diagnosis Date   • Depression 6/2/2020   • Hypertension     • Hypoalbuminemia 11/11/2019   • Multiple open wounds of lower leg 11/3/2019   • Nocturia 6/25/2021   • Urinary retention 11/13/2019      IMO load March 2020         Past Surgical History         Past Surgical History:   Procedure Laterality Date   • PB TOTAL HIP ARTHROPLASTY Right 11/6/2019     Procedure: ARTHROPLASTY, HIP, TOTAL;  Surgeon: Pauline Mo M.D.;  Location: SURGERY [facility];  Service: Orthopedics   • HIP ARTHROPLASTY TOTAL Left       \"resurfacing\"   • TONSILLECTOMY                  Allergies   Allergen Reactions   • Apap-Fd&C Red #40 [place]-Oxycodone     • Percocet [Perloxx]     • Hydrocodone Vomiting   • Oxycodone     • Tramadol        Body mass index is 31.58 kg/m².        Past medical history, social history, surgical history, and review of systems were reviewed and otherwise noncontributory except for above     Exam:  Gait: Antalgic  Alert and oriented x3, no apparent distress  Normocephalic atraumatic, trachea midline, neck is supple  Breathing is nonlabored  Abdomen is nonacute  Right lower extremity: SILT L4-5, S1.  PF, DF, EHL.  Good DP Pulse.  Soft Calf Compartments.  Is a valgus deformity of her right knee which not correct to neutral.  She able flex from 10 to 115 degrees.  She has painless passive range of motion of the right hip.  Left lower extremity: SILT L4-5, S1.  PF, DF, EHL.  Good DP Pulse.  Soft Calf Compartments.      I reviewed her previous x-rays which show evidence of right knee arthritis with joint space narrowing, osteophyte formation, subchondral sclerosis           Impression     1) right knee pain and right knee arthritis here for a presurgical discussion          Plan     1) " She has now been cleared for her TKA. The R/B/A were explained to her.       The risks of surgery were discussed with the patient.   These include pain, bleeding, infection, fractures and dislocations as well as damage to surrounding structures or neurovascular compromise.  The risks include the possibility of need for further surgery, lack of healing, lack of symptom relief.  The elevated risk of DVT following a joint replacement was discussed with the patient, and a plan was made for postoperative DVT prophylaxis.  We did discuss the possibility of leg length discrepancy both apparent and actual following joint replacement.  They do express an understanding that these are man-made parts, with limited lifespans, and that we would not be turning them into a bionic human being. We did discuss the various approaches to a joint replacement, and made no guarantees about incision size or postoperative discharge plans. Certainly there could be anesthetic complications such as heart attack, stroke, respiratory failure, or even death.  All of the patient's questions were answered, they were shown models of the implants and images of their x-rays.  she expressed understanding and wished to proceed.    no

## 2022-03-19 NOTE — ED ADULT TRIAGE NOTE - DOMESTIC TRAVEL HIGH RISK QUESTION
Notified pt of IZABELA recs below. Instructed pt to take 1 tablet 2 hours before the test and 2nd tablet is in case she vomits the first tab. Pt is appreciative. No

## 2022-05-10 ENCOUNTER — OUTPATIENT (OUTPATIENT)
Dept: OUTPATIENT SERVICES | Facility: HOSPITAL | Age: 42
LOS: 1 days | Discharge: HOME | End: 2022-05-10
Payer: COMMERCIAL

## 2022-05-10 ENCOUNTER — RESULT REVIEW (OUTPATIENT)
Age: 42
End: 2022-05-10

## 2022-05-10 DIAGNOSIS — K31.84 GASTROPARESIS: ICD-10-CM

## 2022-05-10 PROCEDURE — 78264 GASTRIC EMPTYING IMG STUDY: CPT | Mod: 26

## 2022-05-11 ENCOUNTER — APPOINTMENT (OUTPATIENT)
Dept: BREAST CENTER | Facility: CLINIC | Age: 42
End: 2022-05-11
Payer: COMMERCIAL

## 2022-05-11 VITALS
WEIGHT: 129 LBS | SYSTOLIC BLOOD PRESSURE: 103 MMHG | DIASTOLIC BLOOD PRESSURE: 69 MMHG | TEMPERATURE: 97.3 F | BODY MASS INDEX: 26 KG/M2 | HEIGHT: 59 IN

## 2022-05-11 DIAGNOSIS — N60.12 DIFFUSE CYSTIC MASTOPATHY OF RIGHT BREAST: ICD-10-CM

## 2022-05-11 DIAGNOSIS — N60.11 DIFFUSE CYSTIC MASTOPATHY OF RIGHT BREAST: ICD-10-CM

## 2022-05-11 DIAGNOSIS — N64.4 MASTODYNIA: ICD-10-CM

## 2022-05-11 PROCEDURE — 99212 OFFICE O/P EST SF 10 MIN: CPT

## 2022-05-11 NOTE — PHYSICAL EXAM
[Normocephalic] : normocephalic [Atraumatic] : atraumatic [No Supraclavicular Adenopathy] : no supraclavicular adenopathy [Symmetrical] : symmetrical [No dominant masses] : no dominant masses in right breast  [No dominant masses] : no dominant masses left breast [No Nipple Discharge] : no left nipple discharge [No Rashes] : no rashes [No Ulceration] : no ulceration [Breast Nipple Inversion] : nipples not inverted [Breast Nipple Retraction] : nipples not retracted [de-identified] : multiple bilateral nodularities but no suspicious masses palpated in either breast; no nipple discharge or retraction and no other skin changes \par tenderness to palpation of right upper outer quadrant / axillary tail. [de-identified] : No axillary lymphadenopathy appreciated. [de-identified] : No axillary lymphadenopathy appreciated.

## 2022-05-11 NOTE — DATA REVIEWED
[FreeTextEntry1] : B/L Screening Mammo - 02/12/2022:\par Breast density: Extremely dense, which lowers the sensitivity of mammography.\par  \par No significant masses, calcifications, or other findings are seen in the right breast. \par  \par There is suggestion of an asymmetry in the posterior lateral left breast on the craniocaudal view only. This could represent overlapping parenchyma. The patient will be recalled for 3-D spot compression view, 2-D/3-D 90 degrees lateral view and only if needed a targeted left breast ultrasound.\par  \par There are no suspicious microcalcifications in the left breast\par \par IMPRESSION:\par  \par Possible asymmetry versus overlapping parenchyma in the posterior lateral left breast. The patient will be recalled for diagnostic mammographic views of the left breast and only if needed a targeted left breast ultrasound\par  \par There is no mammographic evidence of malignancy in the right breast. \par  \par An additional imaging exam of the left breast(s) is recommended. The patient will be sent a letter to return for additional imaging.\par  \par BI-RADS 0: INCOMPLETE - NEED ADDITIONAL IMAGING EVALUATION\par \par \par Left Uni Dx Mammo & Sono - 02/24/2022:\par MAMMOGRAM: \par  \par Tomosynthesis and 2D imaging of the breast(s) were performed.  Current study was also evaluated with a computer aided detection (CAD) system.\par  \par Breast Density: Extremely dense, which lowers the sensitivity of mammography.\par  \par No significant masses, calcifications, or other findings are seen in the left breast.\par  \par US BREAST LIMITED LEFT\par  \par Color flow, Gray scale and real-time ultrasound of the left breast was performed and targeted to the area(s) of interest.\par  \par Ultrasound of the left breast was limited only to the upper outer quadrant and axillary tail region.\par  \par No suspicious solid abnormalities are demonstrated. There is a 1.2 cm cyst cluster in the 1:00 left breast at 4 cm from the nipple.\par \par OVERALL IMPRESSION: \par  \par There is no mammographic or sonographic evidence of malignancy.\par  \par A 1 year screening mammogram and ultrasound of both breast(s) is recommended. The patient will be sent a normal letter.\par  \par The findings and recommendations were discussed with the patient.\par  \par BI-RADS 2: BENIGN

## 2022-05-11 NOTE — REVIEW OF SYSTEMS
[As Noted in HPI] : as noted in HPI [Breast Pain] : breast pain [Breast Lump] : breast lump [Negative] : Constitutional [Nipple Discharge] : no nipple discharge [Nipple Inverted] : no inversion of the nipple

## 2022-05-11 NOTE — HISTORY OF PRESENT ILLNESS
[FreeTextEntry1] : Roberto is a 39 premenopausal F with L breast pain. \par \par She has always had cyclical breast pain, but has noticed that it is getting worse on the left UOQ/left axillary area.  She has not palpated any suspicious breast masses, however her breasts always feel "lumpy" to her.  She denies any nipple discharge or retraction. \par \par Her most recent imaging was a b/l mammogram and US on 19 which revealed no suspicious findings in either breast, deemed BIRADS 1\par \par HISTORICAL RISK FACTORS:\par -no prior breast biopsies or surgeries \par -no family history of breast or ovarian cancer \par -, age at first live birth was 25 \par -prior OCP use in past, x 6 years, stopped at age 24\par \par \par HISTORICAL RISK FACTORS: \par -no prior breast biopsies or surgeries \par -no family history of breast or ovarian cancer \par \par INTERVAL HISTORY:\par 2022 --\par ROBERTO ANGELA is a 41 year old female patient who presents today in follow up for history of fibrocystic breast changes.\par Over the past 5-7 days she has been experiencing new onset right breast pain, which she is describing as "excruciating".\par She feels that her breasts always feel "lumpy", but has not palpated any new masses in either breast and denies any nipple discharge or retraction.\par \par Her most recent imaging:\par B/L Screening Mammo - 2022:\par -Extremely dense.\par -Suggestion of an asymmetry in the posterior lateral left breast on the craniocaudal view only. This could represent overlapping parenchyma. \par BI-RADS 0: INCOMPLETE - NEED ADDITIONAL IMAGING EVALUATION\par \par Left Uni Dx Mammo & Sono - 2022:\par MAMMOGRAM: \par -No significant masses, calcifications, or other findings are seen in the left breast.\par US BREAST LIMITED LEFT\par -No suspicious solid abnormalities are demonstrated. \par -There is a 1.2 cm cyst cluster in the 1:00 left breast at 4 cm from the nipple.\par BI-RADS 2: BENIGN\par

## 2022-05-11 NOTE — ASSESSMENT
[FreeTextEntry1] : ROBERTO ANGELA is a 41 year old female patient who presents today in follow up for history of fibrocystic breast changes.\par Over the past 5-7 days she has been experiencing new onset right breast pain, which she is describing as "excruciating".\par She feels that her breasts always feel "lumpy", but has not palpated any new masses in either breast and denies any nipple discharge or retraction.\par \par Her most recent imaging:\par B/L Screening Mammo - 02/12/2022:\par -Extremely dense.\par -Suggestion of an asymmetry in the posterior lateral left breast on the craniocaudal view only. This could represent overlapping parenchyma. \par BI-RADS 0: INCOMPLETE - NEED ADDITIONAL IMAGING EVALUATION\par \par Left Uni Dx Mammo & Sono - 02/24/2022:\par MAMMOGRAM: \par -No significant masses, calcifications, or other findings are seen in the left breast.\par US BREAST LIMITED LEFT\par -No suspicious solid abnormalities are demonstrated. \par -There is a 1.2 cm cyst cluster in the 1:00 left breast at 4 cm from the nipple.\par BI-RADS 2: BENIGN\par \par \par On exam, multiple bilateral nodularities but no suspicious masses palpated in either breast; no nipple discharge or retraction and no other skin changes \par tenderness to palpation of right upper outer quadrant / axillary tail.\par \par In regards to her breast pain, it may be related to fibrocystic changes within her breast that are hormonally influenced. We spoke about possible interventions including evening primrose oil, supportive bras, and decreasing caffeine intake.  Although none of these have been consistently proven to improve breast pain, they may be tried.  If the pain becomes very severe, there have been studies of tamoxifen being effective for the treatment of breast pain, although there are risks with tamoxifen.  At this time she will try supportive measures.\par \par We discussed dense breasts.  Increasing breast density has been found to increase ones risk of breast cancer, but at this time, there is no clear indication for additional imaging in this setting, as both US and MRI have not been found to improve survival.  One can consider bilateral screening US.  However, out of 1000 women screened, the use of routine US will only identify an additional 3-5 cancers.  The use of US was found to increase the likelihood of undergoing more imaging and more biopsies.  She does have dense breasts.  We have decided to proceed with screening bilateral breast US at this time.  This will be scheduled with her next screening mammogram.\par \par She is otherwise at an average risk for breast cancer and should continue with annual screening mammograms and US. \par \par I spent a total of 15 minutes of face to face time with this patient, greater than 50% of which was spent in counseling and/or coordination of care.\par All of her questions were appropriately answered.\par She knows to call with any concerns.\par \par PLAN: \par -Right Uni Dx Mammo & Sono ordered to be done now due to new onset right breast pain.\par -Will make further recommendations once results are available.

## 2022-05-23 ENCOUNTER — RESULT REVIEW (OUTPATIENT)
Age: 42
End: 2022-05-23

## 2022-05-23 ENCOUNTER — OUTPATIENT (OUTPATIENT)
Dept: OUTPATIENT SERVICES | Facility: HOSPITAL | Age: 42
LOS: 1 days | Discharge: HOME | End: 2022-05-23

## 2022-05-23 ENCOUNTER — OUTPATIENT (OUTPATIENT)
Dept: OUTPATIENT SERVICES | Facility: HOSPITAL | Age: 42
LOS: 1 days | Discharge: HOME | End: 2022-05-23
Payer: COMMERCIAL

## 2022-05-23 DIAGNOSIS — R13.10 DYSPHAGIA, UNSPECIFIED: ICD-10-CM

## 2022-05-23 DIAGNOSIS — R92.8 OTHER ABNORMAL AND INCONCLUSIVE FINDINGS ON DIAGNOSTIC IMAGING OF BREAST: ICD-10-CM

## 2022-05-23 PROCEDURE — 74220 X-RAY XM ESOPHAGUS 1CNTRST: CPT | Mod: 26

## 2022-08-29 NOTE — ED ADULT NURSE NOTE - PMH
ECHO  Pt notified of no acute findings. Provider will discuss results at f/u. Pt reminded of appt date and time.  ----- Message from Emily Munoz MA sent at 8/26/2022  1:41 PM EDT -----    ----- Message -----  From: Janes Suarez PA  Sent: 8/24/2022   7:10 PM EDT  To: Emily Munoz MA    Routine follow-up.  Please let patient know.       Gastritis    Graves disease    Raynaud disease

## 2023-04-28 ENCOUNTER — APPOINTMENT (OUTPATIENT)
Dept: PULMONOLOGY | Facility: CLINIC | Age: 43
End: 2023-04-28
Payer: COMMERCIAL

## 2023-04-28 VITALS
WEIGHT: 133 LBS | OXYGEN SATURATION: 98 % | DIASTOLIC BLOOD PRESSURE: 70 MMHG | HEART RATE: 93 BPM | BODY MASS INDEX: 28.69 KG/M2 | HEIGHT: 57 IN | SYSTOLIC BLOOD PRESSURE: 100 MMHG

## 2023-04-28 PROCEDURE — 99214 OFFICE O/P EST MOD 30 MIN: CPT

## 2023-04-28 RX ORDER — ALBUTEROL SULFATE 90 UG/1
108 (90 BASE) INHALANT RESPIRATORY (INHALATION)
Qty: 1 | Refills: 5 | Status: ACTIVE | COMMUNITY
Start: 2023-04-28 | End: 1900-01-01

## 2023-04-28 RX ORDER — AZELASTINE HYDROCHLORIDE 205.5 UG/1
0.15 SPRAY, METERED NASAL
Qty: 1 | Refills: 2 | Status: ACTIVE | COMMUNITY
Start: 2023-04-28 | End: 1900-01-01

## 2023-04-28 NOTE — REVIEW OF SYSTEMS
[Nasal Congestion] : nasal congestion [Postnasal Drip] : postnasal drip [Cough] : cough [Chest Tightness] : chest tightness [Wheezing] : wheezing [SOB on Exertion] : sob on exertion [Negative] : Endocrine

## 2023-04-28 NOTE — PHYSICAL EXAM
[No Acute Distress] : no acute distress [Normal Oropharynx] : normal oropharynx [Normal Appearance] : normal appearance [No Neck Mass] : no neck mass [Normal Rate/Rhythm] : normal rate/rhythm [Normal S1, S2] : normal s1, s2 [No Murmurs] : no murmurs [No Resp Distress] : no resp distress [No Abnormalities] : no abnormalities [Benign] : benign [Normal Gait] : normal gait [No Clubbing] : no clubbing [No Cyanosis] : no cyanosis [No Edema] : no edema [FROM] : FROM [Normal Color/ Pigmentation] : normal color/ pigmentation [No Focal Deficits] : no focal deficits [Oriented x3] : oriented x3 [Normal Affect] : normal affect [TextBox_68] : Mild rhonchi

## 2023-04-28 NOTE — HISTORY OF PRESENT ILLNESS
[TextBox_4] : Patient last seen almost a year and a half usually used to be seen for cough secondary to asthma.  Patient was doing well on no medication or inhaler 3 weeks ago she started to complain of cough dry in nature tightness wheezing then started to have the postnasal drip nasal congestion nasal pressure given prednisone with mild improvement and then she was started on doxycycline antibiotic couple of days ago patient she feels like 50% better cough improved but not resolved still having nasal congestion still have some tightness and wheezing needs albuterol.\par She [facility] a [facility] which was normal

## 2023-05-30 ENCOUNTER — APPOINTMENT (OUTPATIENT)
Dept: PULMONOLOGY | Facility: CLINIC | Age: 43
End: 2023-05-30
Payer: COMMERCIAL

## 2023-05-30 VITALS — HEIGHT: 57 IN | WEIGHT: 137 LBS | BODY MASS INDEX: 29.56 KG/M2

## 2023-05-30 VITALS — DIASTOLIC BLOOD PRESSURE: 72 MMHG | SYSTOLIC BLOOD PRESSURE: 116 MMHG

## 2023-05-30 PROCEDURE — 99214 OFFICE O/P EST MOD 30 MIN: CPT

## 2023-05-30 RX ORDER — BUDESONIDE AND FORMOTEROL FUMARATE DIHYDRATE 80; 4.5 UG/1; UG/1
80-4.5 AEROSOL RESPIRATORY (INHALATION)
Qty: 1 | Refills: 3 | Status: ACTIVE | COMMUNITY
Start: 2023-05-30 | End: 1900-01-01

## 2023-05-30 NOTE — HISTORY OF PRESENT ILLNESS
[TextBox_4] : She did not get the Symbicort said the pharmacy told her that the order did not go through she has been only taking the albuterol and was started on Singulair by her primary physician said last week she was not doing well she required albuterol almost every day sometimes twice a day for congestion wheezing this week feeling better but she still needs albuterol more frequent.  Her postnasal drip this week is better on  the azelastine\par Chest x-ray from April 2023 was normal

## 2023-06-23 ENCOUNTER — APPOINTMENT (OUTPATIENT)
Dept: PULMONOLOGY | Facility: HOSPITAL | Age: 43
End: 2023-06-23
Payer: COMMERCIAL

## 2023-06-23 ENCOUNTER — OUTPATIENT (OUTPATIENT)
Dept: OUTPATIENT SERVICES | Facility: HOSPITAL | Age: 43
LOS: 1 days | End: 2023-06-23
Payer: COMMERCIAL

## 2023-06-23 DIAGNOSIS — R06.02 SHORTNESS OF BREATH: ICD-10-CM

## 2023-06-23 PROCEDURE — 94060 EVALUATION OF WHEEZING: CPT | Mod: 26

## 2023-06-23 PROCEDURE — 94729 DIFFUSING CAPACITY: CPT | Mod: 26

## 2023-06-23 PROCEDURE — 94070 EVALUATION OF WHEEZING: CPT

## 2023-06-23 PROCEDURE — 94727 GAS DIL/WSHOT DETER LNG VOL: CPT | Mod: 26

## 2023-06-23 PROCEDURE — 94729 DIFFUSING CAPACITY: CPT

## 2023-06-23 PROCEDURE — 94727 GAS DIL/WSHOT DETER LNG VOL: CPT

## 2023-06-24 DIAGNOSIS — R06.02 SHORTNESS OF BREATH: ICD-10-CM

## 2023-06-29 ENCOUNTER — LABORATORY RESULT (OUTPATIENT)
Age: 43
End: 2023-06-29

## 2023-07-03 LAB
A ALTERNATA IGE QN: <0.1 KUA/L
A FUMIGATUS IGE QN: <0.1 KUA/L
C ALBICANS IGE QN: <0.1 KUA/L
C HERBARUM IGE QN: <0.1 KUA/L
CAT DANDER IGE QN: <0.1 KUA/L
COMMON RAGWEED IGE QN: <0.1 KUA/L
D FARINAE IGE QN: <0.1 KUA/L
D PTERONYSS IGE QN: <0.1 KUA/L
DEPRECATED A ALTERNATA IGE RAST QL: 0
DEPRECATED A FUMIGATUS IGE RAST QL: 0
DEPRECATED C ALBICANS IGE RAST QL: 0
DEPRECATED C HERBARUM IGE RAST QL: 0
DEPRECATED CAT DANDER IGE RAST QL: 0
DEPRECATED COMMON RAGWEED IGE RAST QL: 0
DEPRECATED D FARINAE IGE RAST QL: 0
DEPRECATED D PTERONYSS IGE RAST QL: 0
DEPRECATED DOG DANDER IGE RAST QL: 0
DEPRECATED M RACEMOSUS IGE RAST QL: 0
DEPRECATED ROACH IGE RAST QL: 0
DEPRECATED TIMOTHY IGE RAST QL: 0
DEPRECATED WHITE OAK IGE RAST QL: 0
DOG DANDER IGE QN: <0.1 KUA/L
M RACEMOSUS IGE QN: <0.1 KUA/L
ROACH IGE QN: <0.1 KUA/L
TIMOTHY IGE QN: <0.1 KUA/L
TOTAL IGE SMQN RAST: 12 KU/L
WHITE OAK IGE QN: <0.1 KUA/L

## 2023-09-12 ENCOUNTER — APPOINTMENT (OUTPATIENT)
Dept: PULMONOLOGY | Facility: CLINIC | Age: 43
End: 2023-09-12
Payer: COMMERCIAL

## 2023-09-12 VITALS
HEIGHT: 57 IN | SYSTOLIC BLOOD PRESSURE: 102 MMHG | HEART RATE: 83 BPM | BODY MASS INDEX: 29.56 KG/M2 | WEIGHT: 137 LBS | RESPIRATION RATE: 14 BRPM | DIASTOLIC BLOOD PRESSURE: 64 MMHG | OXYGEN SATURATION: 99 %

## 2023-09-12 PROCEDURE — 99214 OFFICE O/P EST MOD 30 MIN: CPT

## 2023-12-18 RX ORDER — MONTELUKAST 10 MG/1
10 TABLET, FILM COATED ORAL
Qty: 90 | Refills: 3 | Status: ACTIVE | COMMUNITY
Start: 2023-09-12 | End: 1900-01-01

## 2024-04-23 ENCOUNTER — APPOINTMENT (OUTPATIENT)
Dept: PULMONOLOGY | Facility: CLINIC | Age: 44
End: 2024-04-23
Payer: COMMERCIAL

## 2024-04-23 VITALS
OXYGEN SATURATION: 96 % | BODY MASS INDEX: 30.3 KG/M2 | DIASTOLIC BLOOD PRESSURE: 60 MMHG | HEART RATE: 77 BPM | WEIGHT: 140 LBS | SYSTOLIC BLOOD PRESSURE: 120 MMHG

## 2024-04-23 DIAGNOSIS — J45.991 COUGH VARIANT ASTHMA: ICD-10-CM

## 2024-04-23 DIAGNOSIS — R09.82 POSTNASAL DRIP: ICD-10-CM

## 2024-04-23 PROCEDURE — 99214 OFFICE O/P EST MOD 30 MIN: CPT

## 2024-04-23 RX ORDER — BUDESONIDE AND FORMOTEROL FUMARATE DIHYDRATE 80; 4.5 UG/1; UG/1
80-4.5 AEROSOL RESPIRATORY (INHALATION)
Qty: 1 | Refills: 2 | Status: ACTIVE | COMMUNITY
Start: 2024-04-23 | End: 1900-01-01

## 2024-04-23 NOTE — PHYSICAL EXAM
[No Acute Distress] : no acute distress [Normal Oropharynx] : normal oropharynx [Normal Appearance] : normal appearance [No Neck Mass] : no neck mass [Normal Rate/Rhythm] : normal rate/rhythm [Normal S1, S2] : normal s1, s2 [No Murmurs] : no murmurs [No Resp Distress] : no resp distress [No Abnormalities] : no abnormalities [No Clubbing] : no clubbing [No Cyanosis] : no cyanosis [No Edema] : no edema [FROM] : FROM [No Focal Deficits] : no focal deficits [Oriented x3] : oriented x3 [Normal Affect] : normal affect [TextBox_68] : Mild rhonchi

## 2024-04-23 NOTE — HISTORY OF PRESENT ILLNESS
[TextBox_4] : Patient coming for follow-up said she was doing good on Symbicort had a COVID in January after she started to complain of cough then resolved and then she ran off the Symbicort now for the last couple of weeks with the change of the weather been having more tightness especially when she go outside and work require albuterol more frequent because of tightness and wheeze and intermittent cough

## 2024-05-02 ENCOUNTER — EMERGENCY (EMERGENCY)
Facility: HOSPITAL | Age: 44
LOS: 0 days | Discharge: HOME | End: 2024-05-02
Attending: STUDENT IN AN ORGANIZED HEALTH CARE EDUCATION/TRAINING PROGRAM
Payer: COMMERCIAL

## 2024-05-02 VITALS
RESPIRATION RATE: 18 BRPM | DIASTOLIC BLOOD PRESSURE: 68 MMHG | HEART RATE: 96 BPM | HEIGHT: 59 IN | TEMPERATURE: 98 F | WEIGHT: 138.89 LBS | SYSTOLIC BLOOD PRESSURE: 149 MMHG | OXYGEN SATURATION: 96 %

## 2024-05-02 DIAGNOSIS — I73.00 RAYNAUD'S SYNDROME WITHOUT GANGRENE: ICD-10-CM

## 2024-05-02 DIAGNOSIS — Z88.0 ALLERGY STATUS TO PENICILLIN: ICD-10-CM

## 2024-05-02 DIAGNOSIS — E05.00 THYROTOXICOSIS WITH DIFFUSE GOITER WITHOUT THYROTOXIC CRISIS OR STORM: ICD-10-CM

## 2024-05-02 DIAGNOSIS — R07.89 OTHER CHEST PAIN: ICD-10-CM

## 2024-05-02 DIAGNOSIS — E03.9 HYPOTHYROIDISM, UNSPECIFIED: ICD-10-CM

## 2024-05-02 DIAGNOSIS — Z82.49 FAMILY HISTORY OF ISCHEMIC HEART DISEASE AND OTHER DISEASES OF THE CIRCULATORY SYSTEM: ICD-10-CM

## 2024-05-02 DIAGNOSIS — Z88.1 ALLERGY STATUS TO OTHER ANTIBIOTIC AGENTS: ICD-10-CM

## 2024-05-02 LAB
ALBUMIN SERPL ELPH-MCNC: 4.4 G/DL — SIGNIFICANT CHANGE UP (ref 3.5–5.2)
ALP SERPL-CCNC: 48 U/L — SIGNIFICANT CHANGE UP (ref 30–115)
ALT FLD-CCNC: 11 U/L — SIGNIFICANT CHANGE UP (ref 0–41)
ANION GAP SERPL CALC-SCNC: 7 MMOL/L — SIGNIFICANT CHANGE UP (ref 7–14)
AST SERPL-CCNC: 10 U/L — SIGNIFICANT CHANGE UP (ref 0–41)
BASOPHILS # BLD AUTO: 0.05 K/UL — SIGNIFICANT CHANGE UP (ref 0–0.2)
BASOPHILS NFR BLD AUTO: 0.7 % — SIGNIFICANT CHANGE UP (ref 0–1)
BILIRUB SERPL-MCNC: 0.4 MG/DL — SIGNIFICANT CHANGE UP (ref 0.2–1.2)
BUN SERPL-MCNC: 18 MG/DL — SIGNIFICANT CHANGE UP (ref 10–20)
CALCIUM SERPL-MCNC: 9 MG/DL — SIGNIFICANT CHANGE UP (ref 8.4–10.5)
CHLORIDE SERPL-SCNC: 106 MMOL/L — SIGNIFICANT CHANGE UP (ref 98–110)
CO2 SERPL-SCNC: 25 MMOL/L — SIGNIFICANT CHANGE UP (ref 17–32)
CREAT SERPL-MCNC: 0.7 MG/DL — SIGNIFICANT CHANGE UP (ref 0.7–1.5)
EGFR: 109 ML/MIN/1.73M2 — SIGNIFICANT CHANGE UP
EOSINOPHIL # BLD AUTO: 0.05 K/UL — SIGNIFICANT CHANGE UP (ref 0–0.7)
EOSINOPHIL NFR BLD AUTO: 0.7 % — SIGNIFICANT CHANGE UP (ref 0–8)
GLUCOSE SERPL-MCNC: 90 MG/DL — SIGNIFICANT CHANGE UP (ref 70–99)
HCG SERPL QL: NEGATIVE — SIGNIFICANT CHANGE UP
HCT VFR BLD CALC: 37.3 % — SIGNIFICANT CHANGE UP (ref 37–47)
HGB BLD-MCNC: 12.9 G/DL — SIGNIFICANT CHANGE UP (ref 12–16)
IMM GRANULOCYTES NFR BLD AUTO: 0.4 % — HIGH (ref 0.1–0.3)
LYMPHOCYTES # BLD AUTO: 1.52 K/UL — SIGNIFICANT CHANGE UP (ref 1.2–3.4)
LYMPHOCYTES # BLD AUTO: 19.9 % — LOW (ref 20.5–51.1)
MAGNESIUM SERPL-MCNC: 2.1 MG/DL — SIGNIFICANT CHANGE UP (ref 1.8–2.4)
MCHC RBC-ENTMCNC: 31.6 PG — HIGH (ref 27–31)
MCHC RBC-ENTMCNC: 34.6 G/DL — SIGNIFICANT CHANGE UP (ref 32–37)
MCV RBC AUTO: 91.4 FL — SIGNIFICANT CHANGE UP (ref 81–99)
MONOCYTES # BLD AUTO: 0.61 K/UL — HIGH (ref 0.1–0.6)
MONOCYTES NFR BLD AUTO: 8 % — SIGNIFICANT CHANGE UP (ref 1.7–9.3)
NEUTROPHILS # BLD AUTO: 5.36 K/UL — SIGNIFICANT CHANGE UP (ref 1.4–6.5)
NEUTROPHILS NFR BLD AUTO: 70.3 % — SIGNIFICANT CHANGE UP (ref 42.2–75.2)
NRBC # BLD: 0 /100 WBCS — SIGNIFICANT CHANGE UP (ref 0–0)
PLATELET # BLD AUTO: 170 K/UL — SIGNIFICANT CHANGE UP (ref 130–400)
PMV BLD: 12 FL — HIGH (ref 7.4–10.4)
POTASSIUM SERPL-MCNC: 4 MMOL/L — SIGNIFICANT CHANGE UP (ref 3.5–5)
POTASSIUM SERPL-SCNC: 4 MMOL/L — SIGNIFICANT CHANGE UP (ref 3.5–5)
PROT SERPL-MCNC: 6.7 G/DL — SIGNIFICANT CHANGE UP (ref 6–8)
RBC # BLD: 4.08 M/UL — LOW (ref 4.2–5.4)
RBC # FLD: 12.6 % — SIGNIFICANT CHANGE UP (ref 11.5–14.5)
SODIUM SERPL-SCNC: 138 MMOL/L — SIGNIFICANT CHANGE UP (ref 135–146)
TROPONIN T, HIGH SENSITIVITY RESULT: <6 NG/L — SIGNIFICANT CHANGE UP (ref 6–13)
WBC # BLD: 7.62 K/UL — SIGNIFICANT CHANGE UP (ref 4.8–10.8)
WBC # FLD AUTO: 7.62 K/UL — SIGNIFICANT CHANGE UP (ref 4.8–10.8)

## 2024-05-02 PROCEDURE — 71046 X-RAY EXAM CHEST 2 VIEWS: CPT

## 2024-05-02 PROCEDURE — 99285 EMERGENCY DEPT VISIT HI MDM: CPT | Mod: 25

## 2024-05-02 PROCEDURE — 36000 PLACE NEEDLE IN VEIN: CPT

## 2024-05-02 PROCEDURE — 83735 ASSAY OF MAGNESIUM: CPT

## 2024-05-02 PROCEDURE — 36415 COLL VENOUS BLD VENIPUNCTURE: CPT

## 2024-05-02 PROCEDURE — 93005 ELECTROCARDIOGRAM TRACING: CPT

## 2024-05-02 PROCEDURE — 93010 ELECTROCARDIOGRAM REPORT: CPT

## 2024-05-02 PROCEDURE — 84703 CHORIONIC GONADOTROPIN ASSAY: CPT

## 2024-05-02 PROCEDURE — 85025 COMPLETE CBC W/AUTO DIFF WBC: CPT

## 2024-05-02 PROCEDURE — 71046 X-RAY EXAM CHEST 2 VIEWS: CPT | Mod: 26

## 2024-05-02 PROCEDURE — 99285 EMERGENCY DEPT VISIT HI MDM: CPT

## 2024-05-02 PROCEDURE — 80053 COMPREHEN METABOLIC PANEL: CPT

## 2024-05-02 PROCEDURE — 84484 ASSAY OF TROPONIN QUANT: CPT

## 2024-05-02 RX ORDER — PENTOXIFYLLINE 400 MG
1 TABLET, EXTENDED RELEASE ORAL
Qty: 0 | Refills: 0 | DISCHARGE

## 2024-05-02 RX ORDER — OMEPRAZOLE 10 MG/1
1 CAPSULE, DELAYED RELEASE ORAL
Qty: 0 | Refills: 0 | DISCHARGE

## 2024-05-02 RX ORDER — LEVOTHYROXINE SODIUM 125 MCG
1 TABLET ORAL
Refills: 0 | DISCHARGE

## 2024-05-02 RX ORDER — MONTELUKAST 4 MG/1
1 TABLET, CHEWABLE ORAL
Refills: 0 | DISCHARGE

## 2024-05-02 RX ORDER — ASPIRIN/CALCIUM CARB/MAGNESIUM 324 MG
324 TABLET ORAL ONCE
Refills: 0 | Status: COMPLETED | OUTPATIENT
Start: 2024-05-02 | End: 2024-05-02

## 2024-05-02 RX ORDER — BUDESONIDE AND FORMOTEROL FUMARATE DIHYDRATE 160; 4.5 UG/1; UG/1
2 AEROSOL RESPIRATORY (INHALATION)
Refills: 0 | DISCHARGE

## 2024-05-02 NOTE — ED PROVIDER NOTE - NSICDXPASTMEDICALHX_GEN_ALL_CORE_FT
PAST MEDICAL HISTORY:  Gastritis     Graves disease     Hypothyroid     Raynaud disease      PAST MEDICAL HISTORY:  Anemia     Gastritis     Graves disease     Hypothyroid     Raynaud disease

## 2024-05-02 NOTE — ED PROVIDER NOTE - ATTENDING APP SHARED VISIT CONTRIBUTION OF CARE
44 yo F with hx of anemia, gastritis, graves disease, raynaud disease who presents with intermittent, nonradiating, sharp L sided cp since yesterday. Nonpleuritic, nonexertional. Had heme appt today who sent pt to ED for eval. Pt typically has R sided chest spasms which feel different than current cp. No fever, sob, leg swelling/pain, hx of clots, hormone use, recent immobilization/surg, malignancy, hemoptysis. Had normal stress test and holter monitor ~5 yrs ago. +FHx of CAD in her grandfather in his 60s.    PMD Dr. Yun    CONSTITUTIONAL: well developed, nontoxic appearing, in no acute distress, speaking in full sentences  SKIN: warm, dry, no rash, cap refill < 2 seconds  HEENT: normocephalic, atraumatic, no conjunctival erythema, moist mucous membranes, patent airway  NECK: supple  CV:  regular rate, regular rhythm, 2+ radial pulses bilaterally  RESP: no wheezes, no rales, no rhonchi, normal work of breathing  ABD: soft, no tenderness, nondistended, no rebound, no guarding  MSK: normal ROM, no cyanosis, no edema, no calf tenderness  NEURO: alert, oriented, grossly unremarkable  PSYCH: cooperative, appropriate    MDM:  Pt here with atypical cp x1 day. Vitals wnl in ED. Low suspicion for PE given no PE risk factors, no tachycardia, no tachypnea, no hypoxemia, PERC negative. Low suspicion for dissection given equal distal pulses, normotensive, no neuro deficits however will check for widened mediastinum on cxr. Low suspicion for esophageal perforation given no recent instrumentation or vomiting, no increased thoracic pressure, no hammans crunch. Plan for labs, ekg, cxr r/o ACS, CHF exac, ptx, pneumomediastinum, pneumonia, pericarditis, myocarditis, pleural effusion.

## 2024-05-02 NOTE — ED PROVIDER NOTE - NSFOLLOWUPINSTRUCTIONS_ED_ALL_ED_FT
Our Emergency Department Referral Coordinators will be reaching out to you in the next 24-48 hours from 9:00am to 5:00pm to schedule a follow up appointment. Please expect a phone call from the hospital in that time frame. If you do not receive a call or if you have any questions or concerns, you can reach them at [phone].  -----------    Chest Pain    Chest pain can be caused by many different conditions which may or may not be dangerous. Causes include heartburn, lung infections, heart attack, blood clot in lungs, skin infections, strain or damage to muscle, cartilage, or bones, etc. In addition to a history and physical examination, an electrocardiogram (ECG) or other lab tests may have been performed to determine the cause of your chest pain. Follow up with your primary care provider or with a cardiologist as instructed.     SEEK IMMEDIATE MEDICAL CARE IF YOU HAVE ANY OF THE FOLLOWING SYMPTOMS: worsening chest pain, coughing up blood, unexplained back/neck/jaw pain, severe abdominal pain, dizziness or lightheadedness, fainting, shortness of breath, sweaty or clammy skin, vomiting, or racing heart beat. These symptoms may represent a serious problem that is an emergency. Do not wait to see if the symptoms will go away. Get medical help right away. Call 911 and do not drive yourself to the hospital.

## 2024-05-02 NOTE — ED PROVIDER NOTE - OBJECTIVE STATEMENT
42 y/o female non smoker, hx anemia, hypothyroid presents to the Ed for evaluation of pinching left sided chest pain intermittent today. no sob, palpitations, dizziness, diaphoresis. no back pain . no weakness to extremities.

## 2024-05-02 NOTE — ED PROVIDER NOTE - PHYSICAL EXAMINATION
generalized: normocephalic , well appearing, comfortable  eyes: PERRLA, EOMI, clear conjunctiva  resp: CTA, no resp distress, no chest wall tenderness or crepitation  cardiac: Regular rate, regular rhythm,  S1S2, no murmurs, no extrasystoles  abdomen: NT/ND, BSx4, no CVA tenderness, no palpable mass  msk: no calf tenderness or swelling  skin: no redness or rashes  neuro: AOx3, gait steady, speech clear, motor and sensory in tact

## 2024-05-02 NOTE — ED PROVIDER NOTE - CONSIDERATION OF ADMISSION OBSERVATION
Considered observation vs admission however pt is a good candidate for d/c home with outpatient f/u given that no life threatening pathology found in ED and pt has close outpatient f/u Consideration of Admission/Observation

## 2024-05-02 NOTE — ED ADULT TRIAGE NOTE - BP NONINVASIVE DIASTOLIC (MM HG)
Outpatient Follow-Up - Palliative and Supportive Care   [facility] Jaime 67 y o  female [id number]    Assessment & Plan  1  Anxiety about health    2  Anxiety    3  Arthritis        Medications adjusted this encounter:  Requested Prescriptions     Signed Prescriptions Disp Refills    ALPRAZolam (XANAX) 0 25 mg tablet 15 tablet 0     Sig: Take 1 tablet (0 25 mg total) by mouth daily at bedtime as needed for anxiety    mirtazapine (REMERON) 15 mg tablet 30 tablet 0     Sig: Take 1 tablet (15 mg total) by mouth daily at bedtime    meloxicam (MOBIC) 7 5 mg tablet 30 tablet 0     Sig: Take 1 tablet (7 5 mg total) by mouth daily with breakfast     Medications Discontinued During This Encounter   Medication Reason    methylPREDNISolone 4 MG tablet therapy pack     ibuprofen (MOTRIN) 600 mg tablet     mirtazapine (REMERON) 7 5 MG tablet Reorder        Sra Allyson Parnell and her granddaughter Gary Farrar were seen today for symptoms and planning cares related to above illnesses  I have reviewed the patient's controlled substance dispensing history in the Prescription Drug Monitoring Program in compliance with the [facility] regulations before prescribing any controlled substances  They are invited to continue to follow with us  If there are questions or concerns, please contact us through our clinic/answering service 24 hours a day, seven days a week  Pineda Irby MD  [facility]'s Palliative and Supportive Care  [phone]      Visit Information    Accompanied By: Family member    Source of History: Self, Family member    History Limitations: Language Barrier - Gary Farrar helps interpret today    Contacts: Shereen Jimenez - [phone]    Follow up visit for:  survivorship    History of Present Illness      This lady presents in f/up of her 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    She was diagnosed in September 2018and completed therapy in March 2019; she continues in survivorship cares under the guidance of Ms Saint Aris and Dr Ghazala Logan  Since last visit, she has been doing well, though her sleep and hand arthritis continue to be problematic  It has been found on a recent sleep study that she does have moderate RONAK  A CPAP machine has been obtained by family, but she has not yet had her appt to calibrate and fit the device  Pains in hands are symmetric, asso with weakness in  strength  No other joint pains are noted  Some TMJ tenderness on L  She has been using ibuprofen TID with little to moderate effect  We agreed to trial meloxicam INSTEAD of ibuprofen today, seeing that her renal function seems passable and she has good gastroprotective meds on file  She is not a candidate for opioid therapy for this chronic non-malignant problem  However, to alleviate anxiety that may be asso with CPAP use, we did offer low-dose alprazolam PRN  Past medical, surgical, social, and family histories are reviewed and pertinent updates are made  Review of Systems   Constitution: Negative for decreased appetite, weight gain and weight loss  HENT: Negative for hoarse voice and nosebleeds  Eyes: Negative for vision loss in left eye and vision loss in right eye  Cardiovascular: Negative for chest pain and dyspnea on exertion  Respiratory: Negative for cough, shortness of breath and wheezing  Endocrine: Negative for polydipsia, polyphagia and polyuria  Skin: Negative for flushing and itching  Musculoskeletal: Positive for arthritis, back pain and joint pain  Negative for falls and joint swelling  Gastrointestinal: Negative for anorexia, jaundice, nausea and vomiting  Genitourinary: Negative for frequency  Neurological: Negative for dizziness  Psychiatric/Behavioral: Negative for depression and memory loss  The patient is not nervous/anxious            Vital Signs    /65 (BP Location: Left arm, Patient Position: Sitting, Cuff Size: Standard)   Pulse 91   Temp 97 5 °F (36 4 °C) (Tympanic)   Resp 18   Wt 68 2 kg (150 lb 6 4 oz)   SpO2 95%   BMI 29 37 kg/m²     Physical Exam and Objective Data  Physical Exam   Constitutional: She is oriented to person, place, and time  She appears well-nourished  No distress  frail   HENT:   Head: Normocephalic and atraumatic  Right Ear: External ear normal    Left Ear: External ear normal    Mouth/Throat: Oropharyngeal exudate (mucous membranes tacky) present  Eyes: Pupils are equal, round, and reactive to light  Conjunctivae and EOM are normal  Right eye exhibits no discharge  Left eye exhibits no discharge  Neck: No tracheal deviation present  Cardiovascular: Normal rate and regular rhythm  Pulmonary/Chest: Effort normal  No stridor  No respiratory distress  Abdominal: Soft  She exhibits no distension  Scaphoid   Musculoskeletal: She exhibits no edema  Neurological: She is alert and oriented to person, place, and time  No cranial nerve deficit  Skin: Skin is warm and dry  No rash noted  She is not diaphoretic  No erythema  No pallor  Psychiatric: She has a normal mood and affect  Her behavior is normal    Thoguhts linear and logical, but pt seems a bit distracted  Judgment and insight deferred today         Radiology and Laboratory:  I personally reviewed and interpreted the following results - reviewed sleep study with family    25+ minutes was spent face to face with Panchito Lee and her family with greater than 50% of the time spent in counseling or coordination of care including discussions of etiology of diagnosis, diagnostic results, impression, and recommendations, treatment instructions, follow up requirements and compliance with treatment regimen   All of the patient's questions were answered during this discussion  68

## 2024-05-02 NOTE — ED PROVIDER NOTE - DIFFERENTIAL DIAGNOSIS
ACS, CHF exac, ptx, pneumomediastinum, pneumonia, pericarditis, myocarditis, pleural effusion Differential Diagnosis

## 2024-05-02 NOTE — ED PROVIDER NOTE - CLINICAL SUMMARY MEDICAL DECISION MAKING FREE TEXT BOX
Pt here with atypical cp x1 day. Vitals wnl in ED. Low suspicion for PE given no PE risk factors, no tachycardia, no tachypnea, no hypoxemia, PERC negative. Labs wnl including negative trop. Ekg nonischemic. Cxr clear. Given ASA 324mg. HEART score 1 (1+ for FHx), low risk MACE. Given cardiology f/u. Considered observation vs admission however pt is a good candidate for d/c home with outpatient f/u given that no life threatening pathology found in ED and pt has close outpatient f/u. Strict ED return precautions given. Pt verbalized understanding and was agreeable with plan.

## 2024-05-02 NOTE — ED PROVIDER NOTE - PATIENT PORTAL LINK FT
You can access the FollowMyHealth Patient Portal offered by [facility] by registering at the following website: [email or web address]. By joining [employer]s FollowMyHealth portal, you will also be able to view your health information using other applications (apps) compatible with our system.

## 2024-05-03 NOTE — CHART NOTE - NSCHARTNOTEFT_GEN_A_CORE
3:30p Tues-Fri appt 5/3- / scheduled on 5/30/24 @ 3:30p w/ Dr. Conteh @ [facility]- 5/3-

## 2024-05-30 ENCOUNTER — APPOINTMENT (OUTPATIENT)
Dept: CARDIOLOGY | Facility: CLINIC | Age: 44
End: 2024-05-30
Payer: COMMERCIAL

## 2024-05-30 VITALS
BODY MASS INDEX: 28.22 KG/M2 | SYSTOLIC BLOOD PRESSURE: 96 MMHG | WEIGHT: 140 LBS | HEART RATE: 84 BPM | DIASTOLIC BLOOD PRESSURE: 70 MMHG | OXYGEN SATURATION: 99 % | HEIGHT: 59 IN

## 2024-05-30 DIAGNOSIS — R07.89 OTHER CHEST PAIN: ICD-10-CM

## 2024-05-30 DIAGNOSIS — Z82.3 FAMILY HISTORY OF STROKE: ICD-10-CM

## 2024-05-30 DIAGNOSIS — R06.02 SHORTNESS OF BREATH: ICD-10-CM

## 2024-05-30 DIAGNOSIS — M34.9 SYSTEMIC SCLEROSIS, UNSPECIFIED: ICD-10-CM

## 2024-05-30 DIAGNOSIS — Z82.49 FAMILY HISTORY OF ISCHEMIC HEART DISEASE AND OTHER DISEASES OF THE CIRCULATORY SYSTEM: ICD-10-CM

## 2024-05-30 PROCEDURE — 99204 OFFICE O/P NEW MOD 45 MIN: CPT | Mod: 25

## 2024-05-30 PROCEDURE — G2211 COMPLEX E/M VISIT ADD ON: CPT | Mod: NC

## 2024-05-30 PROCEDURE — 93000 ELECTROCARDIOGRAM COMPLETE: CPT

## 2024-05-30 RX ORDER — BUDESONIDE AND FORMOTEROL FUMARATE DIHYDRATE 80; 4.5 UG/1; UG/1
80-4.5 AEROSOL RESPIRATORY (INHALATION)
Qty: 1 | Refills: 3 | Status: DISCONTINUED | COMMUNITY
Start: 2023-09-12 | End: 2024-05-30

## 2024-05-30 RX ORDER — BUDESONIDE AND FORMOTEROL FUMARATE DIHYDRATE 160; 4.5 UG/1; UG/1
160-4.5 AEROSOL RESPIRATORY (INHALATION) TWICE DAILY
Qty: 1 | Refills: 3 | Status: DISCONTINUED | COMMUNITY
Start: 2023-04-28 | End: 2024-05-30

## 2024-05-30 RX ORDER — OMEPRAZOLE 40 MG/1
40 CAPSULE, DELAYED RELEASE ORAL
Refills: 0 | Status: DISCONTINUED | COMMUNITY
End: 2024-05-30

## 2024-05-30 RX ORDER — IRON POLYSACCHARIDE COMPLEX 150 MG
150 CAPSULE ORAL
Refills: 0 | Status: DISCONTINUED | COMMUNITY
End: 2024-05-30

## 2024-05-30 RX ORDER — BUDESONIDE AND FORMOTEROL FUMARATE DIHYDRATE 80; 4.5 UG/1; UG/1
80-4.5 AEROSOL RESPIRATORY (INHALATION) TWICE DAILY
Refills: 0 | Status: DISCONTINUED | COMMUNITY
End: 2024-05-30

## 2024-05-30 RX ORDER — AZELASTINE HYDROCHLORIDE 205.5 UG/1
0.15 SPRAY, METERED NASAL
Refills: 0 | Status: DISCONTINUED | COMMUNITY
End: 2024-05-30

## 2024-05-30 NOTE — ASSESSMENT
[FreeTextEntry1] : Ms. ROBERTO ANGELA is a 44 year old woman with PMHx of scleroderma, anemia, and hypothyroidism who is presenting to establish care. She has had atypical chest pain.   -Reviewed history, ECG, labs -Order TTE to rule out structural changes to evaluate for chest pain and PASP (history of scleroderma) -Order exercise stress test in this low risk patient -If above testing normal, RTC in 1 year for ASCVD risk reduction -Encouraged improved lifestyle modifications with these recommendations below: -Plant-based and Mediterranean diets, along with increased fruit, nut, vegetable, legume, and lean vegetable or animal protein (preferably fish) consumption -Engage in at least 150 minutes per week of accumulated moderate-intensity aerobic physical activity or 75 minutes per week of vigorous-intensity aerobic physical activity  Time in encounter, including face to face visit and time reviewing chart: 52  minutes  Josh Conteh MD, FACC Non-Invasive Cardiology [address], [address] Office: [phone]

## 2024-05-30 NOTE — REASON FOR VISIT
[FreeTextEntry1] : Ms. ROBERTO ANGELA is a 44 year old woman with PMHx of scleroderma, anemia, and hypothyroidism who is presenting to establish care. She reports chronic MSK pain for years. However, recently she had a sharp chest pain. She describes it as a stabbing pain. It was not associated with exertion. It would come on for a second, and then resolve. It lasted for 2 weeks. She had no dyspnea. She went to the ED- labs and ECG were normal so she was discharged.  She has FHx of CAD/MI (grandfathers in their 60s).

## 2024-05-31 PROBLEM — D64.9 ANEMIA, UNSPECIFIED: Chronic | Status: ACTIVE | Noted: 2024-05-02

## 2024-06-25 ENCOUNTER — APPOINTMENT (OUTPATIENT)
Dept: CARDIOLOGY | Facility: CLINIC | Age: 44
End: 2024-06-25
Payer: COMMERCIAL

## 2024-06-25 PROCEDURE — 93306 TTE W/DOPPLER COMPLETE: CPT

## 2024-06-25 PROCEDURE — 93015 CV STRESS TEST SUPVJ I&R: CPT

## 2024-06-27 DIAGNOSIS — R94.39 ABNORMAL RESULT OF OTHER CARDIOVASCULAR FUNCTION STUDY: ICD-10-CM

## 2024-07-11 ENCOUNTER — APPOINTMENT (OUTPATIENT)
Dept: PULMONOLOGY | Facility: HOSPITAL | Age: 44
End: 2024-07-11
Payer: COMMERCIAL

## 2024-07-11 ENCOUNTER — OUTPATIENT (OUTPATIENT)
Dept: OUTPATIENT SERVICES | Facility: HOSPITAL | Age: 44
LOS: 1 days | End: 2024-07-11
Payer: COMMERCIAL

## 2024-07-11 DIAGNOSIS — R06.02 SHORTNESS OF BREATH: ICD-10-CM

## 2024-07-11 PROCEDURE — 94729 DIFFUSING CAPACITY: CPT

## 2024-07-11 PROCEDURE — 94729 DIFFUSING CAPACITY: CPT | Mod: 26

## 2024-07-11 PROCEDURE — 94664 DEMO&/EVAL PT USE INHALER: CPT

## 2024-07-11 PROCEDURE — 94727 GAS DIL/WSHOT DETER LNG VOL: CPT

## 2024-07-11 PROCEDURE — 94727 GAS DIL/WSHOT DETER LNG VOL: CPT | Mod: 26

## 2024-07-11 PROCEDURE — 94060 EVALUATION OF WHEEZING: CPT | Mod: 26

## 2024-07-11 PROCEDURE — 94070 EVALUATION OF WHEEZING: CPT

## 2024-07-12 DIAGNOSIS — R06.02 SHORTNESS OF BREATH: ICD-10-CM

## 2024-07-31 ENCOUNTER — OUTPATIENT (OUTPATIENT)
Dept: OUTPATIENT SERVICES | Facility: HOSPITAL | Age: 44
LOS: 1 days | End: 2024-07-31
Payer: COMMERCIAL

## 2024-07-31 ENCOUNTER — RESULT REVIEW (OUTPATIENT)
Age: 44
End: 2024-07-31

## 2024-07-31 DIAGNOSIS — R07.89 OTHER CHEST PAIN: ICD-10-CM

## 2024-07-31 DIAGNOSIS — Z00.8 ENCOUNTER FOR OTHER GENERAL EXAMINATION: ICD-10-CM

## 2024-07-31 PROCEDURE — 75574 CT ANGIO HRT W/3D IMAGE: CPT

## 2024-07-31 PROCEDURE — 75574 CT ANGIO HRT W/3D IMAGE: CPT | Mod: 26

## 2024-08-01 DIAGNOSIS — R07.89 OTHER CHEST PAIN: ICD-10-CM

## 2024-08-08 PROBLEM — I25.10 NON-OCCLUSIVE CORONARY ARTERY DISEASE: Status: ACTIVE | Noted: 2024-08-08

## 2024-09-12 RX ORDER — ATORVASTATIN CALCIUM 10 MG/1
10 TABLET, FILM COATED ORAL DAILY
Qty: 30 | Refills: 11 | Status: ACTIVE | COMMUNITY
Start: 2024-09-12 | End: 1900-01-01

## 2024-11-22 ENCOUNTER — APPOINTMENT (OUTPATIENT)
Dept: ORTHOPEDIC SURGERY | Facility: CLINIC | Age: 44
End: 2024-11-22
Payer: COMMERCIAL

## 2024-11-22 VITALS
WEIGHT: 140 LBS | BODY MASS INDEX: 28.22 KG/M2 | HEIGHT: 59 IN | DIASTOLIC BLOOD PRESSURE: 74 MMHG | SYSTOLIC BLOOD PRESSURE: 117 MMHG | HEART RATE: 99 BPM

## 2024-11-22 DIAGNOSIS — M17.11 UNILATERAL PRIMARY OSTEOARTHRITIS, RIGHT KNEE: ICD-10-CM

## 2024-11-22 PROCEDURE — 73564 X-RAY EXAM KNEE 4 OR MORE: CPT | Mod: RT

## 2024-11-22 PROCEDURE — 20610 DRAIN/INJ JOINT/BURSA W/O US: CPT | Mod: RT

## 2024-11-22 PROCEDURE — 99204 OFFICE O/P NEW MOD 45 MIN: CPT | Mod: 25

## 2024-12-04 ENCOUNTER — NON-APPOINTMENT (OUTPATIENT)
Age: 44
End: 2024-12-04

## 2024-12-04 ENCOUNTER — APPOINTMENT (OUTPATIENT)
Dept: CARDIOLOGY | Facility: CLINIC | Age: 44
End: 2024-12-04
Payer: COMMERCIAL

## 2024-12-04 ENCOUNTER — RESULT CHARGE (OUTPATIENT)
Age: 44
End: 2024-12-04

## 2024-12-04 VITALS
DIASTOLIC BLOOD PRESSURE: 73 MMHG | BODY MASS INDEX: 27.01 KG/M2 | HEART RATE: 70 BPM | WEIGHT: 134 LBS | SYSTOLIC BLOOD PRESSURE: 106 MMHG | HEIGHT: 59 IN

## 2024-12-04 DIAGNOSIS — E78.00 PURE HYPERCHOLESTEROLEMIA, UNSPECIFIED: ICD-10-CM

## 2024-12-04 DIAGNOSIS — Z82.3 FAMILY HISTORY OF STROKE: ICD-10-CM

## 2024-12-04 DIAGNOSIS — Z87.898 PERSONAL HISTORY OF OTHER SPECIFIED CONDITIONS: ICD-10-CM

## 2024-12-04 DIAGNOSIS — Z78.9 OTHER SPECIFIED HEALTH STATUS: ICD-10-CM

## 2024-12-04 DIAGNOSIS — Z82.49 FAMILY HISTORY OF ISCHEMIC HEART DISEASE AND OTHER DISEASES OF THE CIRCULATORY SYSTEM: ICD-10-CM

## 2024-12-04 DIAGNOSIS — I25.10 ATHEROSCLEROTIC HEART DISEASE OF NATIVE CORONARY ARTERY W/OUT ANGINA PECTORIS: ICD-10-CM

## 2024-12-04 PROCEDURE — G2211 COMPLEX E/M VISIT ADD ON: CPT | Mod: NC

## 2024-12-04 PROCEDURE — 93000 ELECTROCARDIOGRAM COMPLETE: CPT

## 2024-12-04 PROCEDURE — 99214 OFFICE O/P EST MOD 30 MIN: CPT

## 2024-12-04 RX ORDER — MONTELUKAST 10 MG/1
10 TABLET, FILM COATED ORAL
Qty: 90 | Refills: 3 | Status: ACTIVE | COMMUNITY

## 2024-12-04 RX ORDER — EZETIMIBE 10 MG/1
10 TABLET ORAL
Qty: 90 | Refills: 3 | Status: ACTIVE | COMMUNITY
Start: 2024-12-04 | End: 1900-01-01

## 2025-01-06 ENCOUNTER — RX RENEWAL (OUTPATIENT)
Age: 45
End: 2025-01-06

## 2025-01-07 ENCOUNTER — APPOINTMENT (OUTPATIENT)
Dept: PULMONOLOGY | Facility: CLINIC | Age: 45
End: 2025-01-07
Payer: COMMERCIAL

## 2025-01-07 VITALS
HEART RATE: 83 BPM | OXYGEN SATURATION: 99 % | SYSTOLIC BLOOD PRESSURE: 122 MMHG | BODY MASS INDEX: 26.81 KG/M2 | HEIGHT: 59 IN | WEIGHT: 133 LBS | DIASTOLIC BLOOD PRESSURE: 72 MMHG

## 2025-01-07 DIAGNOSIS — J45.991 COUGH VARIANT ASTHMA: ICD-10-CM

## 2025-01-07 DIAGNOSIS — R09.82 POSTNASAL DRIP: ICD-10-CM

## 2025-01-07 PROCEDURE — G2211 COMPLEX E/M VISIT ADD ON: CPT | Mod: NC

## 2025-01-07 PROCEDURE — 99214 OFFICE O/P EST MOD 30 MIN: CPT

## 2025-01-07 RX ORDER — BUDESONIDE AND FORMOTEROL FUMARATE DIHYDRATE 160; 4.5 UG/1; UG/1
160-4.5 AEROSOL RESPIRATORY (INHALATION)
Qty: 30.6 | Refills: 0 | Status: ACTIVE | COMMUNITY
Start: 2025-01-07 | End: 1900-01-01

## 2025-01-07 RX ORDER — AZELASTINE HYDROCHLORIDE 137 UG/1
0.1 SPRAY, METERED NASAL
Qty: 1 | Refills: 3 | Status: ACTIVE | COMMUNITY
Start: 2025-01-07 | End: 1900-01-01

## 2025-03-17 ENCOUNTER — RX RENEWAL (OUTPATIENT)
Age: 45
End: 2025-03-17

## 2025-05-13 ENCOUNTER — APPOINTMENT (OUTPATIENT)
Dept: PULMONOLOGY | Facility: CLINIC | Age: 45
End: 2025-05-13
Payer: COMMERCIAL

## 2025-05-13 VITALS
HEART RATE: 94 BPM | OXYGEN SATURATION: 98 % | BODY MASS INDEX: 27.62 KG/M2 | DIASTOLIC BLOOD PRESSURE: 80 MMHG | SYSTOLIC BLOOD PRESSURE: 122 MMHG | HEIGHT: 59 IN | WEIGHT: 137 LBS

## 2025-05-13 DIAGNOSIS — J45.991 COUGH VARIANT ASTHMA: ICD-10-CM

## 2025-05-13 DIAGNOSIS — R09.82 POSTNASAL DRIP: ICD-10-CM

## 2025-05-13 PROCEDURE — 99214 OFFICE O/P EST MOD 30 MIN: CPT

## 2025-05-13 PROCEDURE — G2211 COMPLEX E/M VISIT ADD ON: CPT | Mod: NC

## 2025-05-29 ENCOUNTER — RESULT CHARGE (OUTPATIENT)
Age: 45
End: 2025-05-29

## 2025-05-29 ENCOUNTER — APPOINTMENT (OUTPATIENT)
Dept: CARDIOLOGY | Facility: CLINIC | Age: 45
End: 2025-05-29

## 2025-06-13 ENCOUNTER — APPOINTMENT (OUTPATIENT)
Dept: ORTHOPEDIC SURGERY | Facility: CLINIC | Age: 45
End: 2025-06-13
Payer: COMMERCIAL

## 2025-06-13 PROCEDURE — 99214 OFFICE O/P EST MOD 30 MIN: CPT

## 2025-06-26 ENCOUNTER — APPOINTMENT (OUTPATIENT)
Dept: CARDIOLOGY | Facility: CLINIC | Age: 45
End: 2025-06-26
Payer: COMMERCIAL

## 2025-06-26 ENCOUNTER — NON-APPOINTMENT (OUTPATIENT)
Age: 45
End: 2025-06-26

## 2025-06-26 ENCOUNTER — RESULT CHARGE (OUTPATIENT)
Age: 45
End: 2025-06-26

## 2025-06-26 VITALS
HEIGHT: 59 IN | SYSTOLIC BLOOD PRESSURE: 111 MMHG | DIASTOLIC BLOOD PRESSURE: 74 MMHG | HEART RATE: 73 BPM | BODY MASS INDEX: 27.82 KG/M2 | WEIGHT: 138 LBS

## 2025-06-26 PROCEDURE — 99214 OFFICE O/P EST MOD 30 MIN: CPT

## 2025-06-26 PROCEDURE — 93000 ELECTROCARDIOGRAM COMPLETE: CPT

## 2025-06-26 PROCEDURE — G2211 COMPLEX E/M VISIT ADD ON: CPT | Mod: NC

## 2025-07-07 ENCOUNTER — APPOINTMENT (OUTPATIENT)
Dept: PULMONOLOGY | Facility: CLINIC | Age: 45
End: 2025-07-07

## 2025-07-29 DIAGNOSIS — Z86.711 PERSONAL HISTORY OF PULMONARY EMBOLISM: ICD-10-CM

## 2025-08-06 ENCOUNTER — APPOINTMENT (OUTPATIENT)
Dept: PULMONOLOGY | Facility: CLINIC | Age: 45
End: 2025-08-06
Payer: COMMERCIAL

## 2025-08-06 VITALS
HEIGHT: 59 IN | SYSTOLIC BLOOD PRESSURE: 97 MMHG | BODY MASS INDEX: 27.01 KG/M2 | WEIGHT: 134 LBS | HEART RATE: 78 BPM | DIASTOLIC BLOOD PRESSURE: 72 MMHG | OXYGEN SATURATION: 100 %

## 2025-08-06 DIAGNOSIS — R09.82 POSTNASAL DRIP: ICD-10-CM

## 2025-08-06 DIAGNOSIS — I27.82 CHRONIC PULMONARY EMBOLISM: ICD-10-CM

## 2025-08-06 DIAGNOSIS — J45.991 COUGH VARIANT ASTHMA: ICD-10-CM

## 2025-08-06 PROCEDURE — 99214 OFFICE O/P EST MOD 30 MIN: CPT

## 2025-08-06 PROCEDURE — G2211 COMPLEX E/M VISIT ADD ON: CPT | Mod: NC

## 2025-08-08 DIAGNOSIS — R93.89 ABNORMAL FINDINGS ON DIAGNOSTIC IMAGING OF OTHER SPECIFIED BODY STRUCTURES: ICD-10-CM

## 2025-08-13 ENCOUNTER — APPOINTMENT (OUTPATIENT)
Dept: CARDIOLOGY | Facility: CLINIC | Age: 45
End: 2025-08-13
Payer: COMMERCIAL

## 2025-08-13 PROCEDURE — 93306 TTE W/DOPPLER COMPLETE: CPT

## 2025-09-02 ENCOUNTER — RESULT REVIEW (OUTPATIENT)
Age: 45
End: 2025-09-02

## 2025-09-02 ENCOUNTER — OUTPATIENT (OUTPATIENT)
Dept: OUTPATIENT SERVICES | Facility: HOSPITAL | Age: 45
LOS: 1 days | End: 2025-09-02
Payer: COMMERCIAL

## 2025-09-02 DIAGNOSIS — R93.89 ABNORMAL FINDINGS ON DIAGNOSTIC IMAGING OF OTHER SPECIFIED BODY STRUCTURES: ICD-10-CM

## 2025-09-02 DIAGNOSIS — Z00.8 ENCOUNTER FOR OTHER GENERAL EXAMINATION: ICD-10-CM

## 2025-09-02 PROCEDURE — 71250 CT THORAX DX C-: CPT | Mod: 26

## 2025-09-02 PROCEDURE — 71250 CT THORAX DX C-: CPT

## 2025-09-03 DIAGNOSIS — R93.89 ABNORMAL FINDINGS ON DIAGNOSTIC IMAGING OF OTHER SPECIFIED BODY STRUCTURES: ICD-10-CM
